# Patient Record
Sex: FEMALE | Race: WHITE | Employment: FULL TIME | URBAN - METROPOLITAN AREA
[De-identification: names, ages, dates, MRNs, and addresses within clinical notes are randomized per-mention and may not be internally consistent; named-entity substitution may affect disease eponyms.]

---

## 2024-01-29 ENCOUNTER — OFFICE VISIT (OUTPATIENT)
Dept: FAMILY MEDICINE CLINIC | Facility: CLINIC | Age: 34
End: 2024-01-29

## 2024-01-29 VITALS
WEIGHT: 151 LBS | HEIGHT: 64 IN | SYSTOLIC BLOOD PRESSURE: 118 MMHG | HEART RATE: 80 BPM | TEMPERATURE: 98.3 F | BODY MASS INDEX: 25.78 KG/M2 | DIASTOLIC BLOOD PRESSURE: 70 MMHG | OXYGEN SATURATION: 99 %

## 2024-01-29 DIAGNOSIS — Z59.89 DOES NOT HAVE HEALTH INSURANCE: ICD-10-CM

## 2024-01-29 DIAGNOSIS — Z32.00 POSSIBLE PREGNANCY: Primary | ICD-10-CM

## 2024-01-29 DIAGNOSIS — R10.32 LEFT LOWER QUADRANT ABDOMINAL PAIN: ICD-10-CM

## 2024-01-29 DIAGNOSIS — Z76.89 ENCOUNTER TO ESTABLISH CARE: ICD-10-CM

## 2024-01-29 DIAGNOSIS — Z32.00 ENCOUNTER FOR ASSESSMENT FOR SUSPECTED ECTOPIC PREGNANCY: ICD-10-CM

## 2024-01-29 DIAGNOSIS — N92.6 MISSED PERIOD: ICD-10-CM

## 2024-01-29 DIAGNOSIS — O26.899 PELVIC PAIN DURING PREGNANCY: ICD-10-CM

## 2024-01-29 DIAGNOSIS — R10.2 PELVIC PAIN DURING PREGNANCY: ICD-10-CM

## 2024-01-29 LAB — SL AMB POCT URINE HCG: POSITIVE

## 2024-01-29 PROCEDURE — 81025 URINE PREGNANCY TEST: CPT | Performed by: FAMILY MEDICINE

## 2024-01-29 PROCEDURE — 99203 OFFICE O/P NEW LOW 30 MIN: CPT | Performed by: FAMILY MEDICINE

## 2024-01-29 SDOH — ECONOMIC STABILITY - INCOME SECURITY: OTHER PROBLEMS RELATED TO HOUSING AND ECONOMIC CIRCUMSTANCES: Z59.89

## 2024-01-29 NOTE — ASSESSMENT & PLAN NOTE
Recent irregular periods since October 2023, per patient last menstrual period was 11/29/23, but not sure as it was mainly just spotting   Took home pregnancy test on 12/24/23 which was positive   POCT pregnancy test positive today   Per patient this is first pregnancy ever     Established care at this visit   Paper work for [facility] authorization given   Referral for social work to apply for insurance as patient is self pay   Initial OB panel ordered at this visit   Instructed patient to complete labs after applying for insurance   Schedule initial OB intake visit

## 2024-01-29 NOTE — ASSESSMENT & PLAN NOTE
LLQ abdominal pain with some pelvic pain. Tender to palpation   Recent home pregnancy and POCT pregnancy tests positive   Per patient, this is first pregnancy ever     Suspect ectopic pregnancy due to location of pain and first pregnancy   Transvaginal US and quantitative beta-HCG labs   Social work referral as patient is self-pay and testing is expensive   Instructed patient to schedule US and complete labs after applying for insurance/speaking with .   Complete [facility] authorization paperwork   Schedule initial OB intake visit

## 2024-01-29 NOTE — PROGRESS NOTES
[facility] Office Visit  Inderjit Munoz  [id number]    Assessment/Plan:     1. Possible pregnancy  Assessment & Plan:  Recent irregular periods since October 2023, per patient last menstrual period was 11/29/23, but not sure as it was mainly just spotting   Took home pregnancy test on 12/24/23 which was positive   POCT pregnancy test positive today   Per patient this is first pregnancy ever     Established care at this visit   Paper work for [facility] authorization given   Referral for social work to apply for insurance as patient is self pay   Initial OB panel ordered at this visit   Instructed patient to complete labs after applying for insurance   Schedule initial OB intake visit     Orders:  -     POCT urine HCG  -     OBSTETRIC PANEL W/FOURTH GENERATION HIV & HEPATITIS C AB W/REFL; Future  -     hCG, quantitative; Future    2. Missed period  Comments:  LMP 11/29/23 but unclear as periods have been irregular/spotting for 1-2 months prior to stopping  Orders:  -     OBSTETRIC PANEL W/FOURTH GENERATION HIV & HEPATITIS C AB W/REFL; Future  -     hCG, quantitative; Future    3. Left lower quadrant abdominal pain  Assessment & Plan:  LLQ abdominal pain with some pelvic pain. Tender to palpation   Recent home pregnancy and POCT pregnancy tests positive   Per patient, this is first pregnancy ever     Suspect ectopic pregnancy due to location of pain and first pregnancy   Transvaginal US and quantitative beta-HCG labs   Social work referral as patient is self-pay and testing is expensive   Instructed patient to schedule US and complete labs after applying for insurance/speaking with .   Complete [facility] authorization paperwork   Schedule initial OB intake visit     Orders:  -     US pelvis complete w transvaginal; Future; Expected date: 01/29/2024    4. Pelvic pain during pregnancy  -     US pelvis complete w transvaginal; Future; Expected date: 01/29/2024    5. Encounter for assessment for  suspected ectopic pregnancy  -     US pelvis complete w transvaginal; Future; Expected date: 01/29/2024  -     hCG, quantitative; Future    6. Encounter to establish care    7. Does not have health insurance  -     Ambulatory Referral to Social Work Care Management Program; Future          Return for Initial OB intake .     Subjective:   JUANITO Munoz is a 33 y.o. female who presents to establish care.   She states that periods have been irregular. In September was her last regular period, in October she was spotting, and in November she did not get her period at all.   She thinks her last period was on 11/29/2023.   She took a home pregnancy test on 12/24/2023 which was positive. She then called to make an appointment at [facility] to establish care and receive prenatal services.   Per patient this is her first time being pregnant. This is an unexpected, but wanted pregnancy. The father of the baby is not involved.   She is having some headaches and some mild pain/spasms on the left side of the lower abdomen/pelvis region.   She also had a cold recently that is resolving with some residual cough and congestion.   She rents a room in a house. She lives alone in her room but there are 2 other men who rent rooms in the house as well. She currently works at [employer] as a .      Review of Systems   Constitutional:  Negative for chills and fever.   HENT:  Positive for congestion. Negative for sore throat.    Eyes:  Negative for pain and redness.   Respiratory:  Positive for cough. Negative for chest tightness and shortness of breath.    Cardiovascular:  Negative for chest pain and palpitations.   Gastrointestinal:  Positive for abdominal pain (LLQ, pelvis) and nausea. Negative for diarrhea.   Genitourinary:  Positive for vaginal discharge (white, sticky). Negative for difficulty urinating, dysuria and hematuria.   Musculoskeletal:  Negative for back pain and myalgias.   Skin:  Negative for color change,  "pallor and rash.   Neurological:  Positive for headaches. Negative for dizziness and light-headedness.        Objective:     /70 (BP Location: Left arm, Patient Position: Sitting, Cuff Size: Standard)   Pulse 80   Temp 98.3 °F (36.8 °C) (Tympanic)   Ht 5' 4\" (1.626 m)   Wt 68.5 kg (151 lb)   SpO2 99%   BMI 25.92 kg/m²      Physical Exam  Vitals reviewed.   Constitutional:       Appearance: She is overweight.   HENT:      Head: Normocephalic and atraumatic.      Right Ear: Tympanic membrane, ear canal and external ear normal.      Left Ear: Tympanic membrane, ear canal and external ear normal.      Nose: Nose normal.      Mouth/Throat:      Mouth: Mucous membranes are moist.      Pharynx: Oropharynx is clear. No oropharyngeal exudate or posterior oropharyngeal erythema.   Eyes:      General: No scleral icterus.     Extraocular Movements: Extraocular movements intact.      Conjunctiva/sclera: Conjunctivae normal.      Pupils: Pupils are equal, round, and reactive to light.   Cardiovascular:      Rate and Rhythm: Normal rate and regular rhythm.      Heart sounds: Normal heart sounds. No murmur heard.  Pulmonary:      Effort: Pulmonary effort is normal. No respiratory distress.      Breath sounds: Normal breath sounds. No wheezing.   Abdominal:      General: Bowel sounds are normal.      Palpations: Abdomen is soft. There is no mass.      Tenderness: There is abdominal tenderness in the left lower quadrant.   Musculoskeletal:         General: Normal range of motion.      Cervical back: Normal range of motion.      Right lower leg: No edema.      Left lower leg: No edema.   Skin:     General: Skin is warm.   Neurological:      Mental Status: She is alert and oriented to person, place, and time.   Psychiatric:         Mood and Affect: Mood normal.         Behavior: Behavior normal.          ** Please Note: This note may have been constructed using a voice recognition system **     Sheron Cullen, " DO  01/29/24  9:28 AM

## 2024-01-30 ENCOUNTER — APPOINTMENT (OUTPATIENT)
Dept: LAB | Facility: HOSPITAL | Age: 34
End: 2024-01-30
Payer: COMMERCIAL

## 2024-01-30 ENCOUNTER — HOSPITAL ENCOUNTER (OUTPATIENT)
Dept: RADIOLOGY | Facility: HOSPITAL | Age: 34
Discharge: HOME | End: 2024-01-30
Payer: COMMERCIAL

## 2024-01-30 DIAGNOSIS — Z32.00 ENCOUNTER FOR ASSESSMENT FOR SUSPECTED ECTOPIC PREGNANCY: ICD-10-CM

## 2024-01-30 DIAGNOSIS — Z32.00 POSSIBLE PREGNANCY: ICD-10-CM

## 2024-01-30 DIAGNOSIS — O26.899 PELVIC PAIN DURING PREGNANCY: ICD-10-CM

## 2024-01-30 DIAGNOSIS — R10.2 PELVIC PAIN DURING PREGNANCY: ICD-10-CM

## 2024-01-30 DIAGNOSIS — N92.6 MISSED PERIOD: ICD-10-CM

## 2024-01-30 DIAGNOSIS — R10.32 LEFT LOWER QUADRANT ABDOMINAL PAIN: ICD-10-CM

## 2024-01-30 LAB
ABO GROUP BLD: NORMAL
B-HCG SERPL-ACNC: ABNORMAL MIU/ML (ref 0–5)
BACTERIA UR QL AUTO: ABNORMAL /HPF
BASOPHILS # BLD AUTO: 0.02 THOUSANDS/ÂΜL (ref 0–0.1)
BASOPHILS NFR BLD AUTO: 0 % (ref 0–1)
BILIRUB UR QL STRIP: NEGATIVE
BLD GP AB SCN SERPL QL: NEGATIVE
CLARITY UR: CLEAR
COLOR UR: YELLOW
EOSINOPHIL # BLD AUTO: 0.04 THOUSAND/ÂΜL (ref 0–0.61)
EOSINOPHIL NFR BLD AUTO: 1 % (ref 0–6)
ERYTHROCYTE [DISTWIDTH] IN BLOOD BY AUTOMATED COUNT: 13.3 % (ref 11.6–15.1)
GLUCOSE UR STRIP-MCNC: NEGATIVE MG/DL
HCT VFR BLD AUTO: 35.9 % (ref 34.8–46.1)
HGB BLD-MCNC: 12.4 G/DL (ref 11.5–15.4)
HGB UR QL STRIP.AUTO: NEGATIVE
HIV 1+2 AB+HIV1 P24 AG SERPL QL IA: NORMAL
HIV 2 AB SERPL QL IA: NORMAL
HIV1 AB SERPL QL IA: NORMAL
HIV1 P24 AG SERPL QL IA: NORMAL
IMM GRANULOCYTES # BLD AUTO: 0.02 THOUSAND/UL (ref 0–0.2)
IMM GRANULOCYTES NFR BLD AUTO: 0 % (ref 0–2)
KETONES UR STRIP-MCNC: NEGATIVE MG/DL
LEUKOCYTE ESTERASE UR QL STRIP: ABNORMAL
LYMPHOCYTES # BLD AUTO: 1.34 THOUSANDS/ÂΜL (ref 0.6–4.47)
LYMPHOCYTES NFR BLD AUTO: 22 % (ref 14–44)
MCH RBC QN AUTO: 30.4 PG (ref 26.8–34.3)
MCHC RBC AUTO-ENTMCNC: 34.5 G/DL (ref 31.4–37.4)
MCV RBC AUTO: 88 FL (ref 82–98)
MONOCYTES # BLD AUTO: 0.32 THOUSAND/ÂΜL (ref 0.17–1.22)
MONOCYTES NFR BLD AUTO: 5 % (ref 4–12)
NEUTROPHILS # BLD AUTO: 4.41 THOUSANDS/ÂΜL (ref 1.85–7.62)
NEUTS SEG NFR BLD AUTO: 72 % (ref 43–75)
NITRITE UR QL STRIP: NEGATIVE
NON-SQ EPI CELLS URNS QL MICRO: ABNORMAL /HPF
NRBC BLD AUTO-RTO: 0 /100 WBCS
PH UR STRIP.AUTO: 7 [PH]
PLATELET # BLD AUTO: 245 THOUSANDS/UL (ref 149–390)
PMV BLD AUTO: 8.7 FL (ref 8.9–12.7)
PROT UR STRIP-MCNC: ABNORMAL MG/DL
RBC # BLD AUTO: 4.08 MILLION/UL (ref 3.81–5.12)
RBC #/AREA URNS AUTO: ABNORMAL /HPF
RH BLD: POSITIVE
RUBV IGG SERPL IA-ACNC: 151.5 IU/ML
SP GR UR STRIP.AUTO: 1.01 (ref 1–1.03)
SPECIMEN EXPIRATION DATE: NORMAL
TREPONEMA PALLIDUM IGG+IGM AB [PRESENCE] IN SERUM OR PLASMA BY IMMUNOASSAY: NORMAL
UROBILINOGEN UR QL STRIP.AUTO: 0.2 E.U./DL
WBC # BLD AUTO: 6.15 THOUSAND/UL (ref 4.31–10.16)
WBC #/AREA URNS AUTO: ABNORMAL /HPF

## 2024-01-30 PROCEDURE — 86780 TREPONEMA PALLIDUM: CPT

## 2024-01-30 PROCEDURE — 86762 RUBELLA ANTIBODY: CPT

## 2024-01-30 PROCEDURE — 86850 RBC ANTIBODY SCREEN: CPT

## 2024-01-30 PROCEDURE — 87340 HEPATITIS B SURFACE AG IA: CPT

## 2024-01-30 PROCEDURE — 76801 OB US < 14 WKS SINGLE FETUS: CPT

## 2024-01-30 PROCEDURE — 86900 BLOOD TYPING SEROLOGIC ABO: CPT

## 2024-01-30 PROCEDURE — 81001 URINALYSIS AUTO W/SCOPE: CPT

## 2024-01-30 PROCEDURE — 87086 URINE CULTURE/COLONY COUNT: CPT

## 2024-01-30 PROCEDURE — 85025 COMPLETE CBC W/AUTO DIFF WBC: CPT

## 2024-01-30 PROCEDURE — 86901 BLOOD TYPING SEROLOGIC RH(D): CPT

## 2024-01-30 PROCEDURE — 84702 CHORIONIC GONADOTROPIN TEST: CPT

## 2024-01-30 PROCEDURE — 36415 COLL VENOUS BLD VENIPUNCTURE: CPT

## 2024-01-30 PROCEDURE — 86803 HEPATITIS C AB TEST: CPT

## 2024-01-30 PROCEDURE — 87389 HIV-1 AG W/HIV-1&-2 AB AG IA: CPT

## 2024-01-31 LAB
HBV SURFACE AG SER QL: NORMAL
HCV AB S/CO SERPL IA: NON REACTIVE
SL AMB INTERPRETATION: NORMAL

## 2024-02-01 LAB — BACTERIA UR CULT: NORMAL

## 2024-02-05 NOTE — PROGRESS NOTES
OB/GYN  PN Visit  Inderjit Aguero  [id number]  2024  Dr. Harleen Soto MD    S: 33 y.o.  Unknown here for PN visit. She denies contractions. She denies leakage of fluid and vaginal bleeding. She does not report fetal movement yet. Her pregnancy is uncomplicated.     Patient is from [place], [place] in  and is currently uninsured. Patient is unsure of LMP, had spotting on 2024. Periods are usually painful, last 1-2 weeks. Has cysts in ovaries per [facility], patient thought she would never get pregnant but she is excited about the pregnancy. FOB is not involved.     O:  Vitals:    24 0815   BP: 124/60   Pulse: 73   Resp: 18   Temp: (!) 97.3 °F (36.3 °C)   SpO2: 100%     Physical Exam  Vitals reviewed.   Constitutional:       General: She is not in acute distress.     Appearance: Normal appearance.   Cardiovascular:      Rate and Rhythm: Normal rate and regular rhythm.      Heart sounds: Normal heart sounds. No murmur heard.  Pulmonary:      Effort: Pulmonary effort is normal. No respiratory distress.      Breath sounds: Normal breath sounds.   Abdominal:      General: Bowel sounds are normal.      Palpations: Abdomen is soft.      Tenderness: There is no abdominal tenderness.      Comments: Gravid     Musculoskeletal:      Right lower leg: No edema.      Left lower leg: No edema.   Neurological:      Mental Status: She is alert.       Fundal height: 13cm    FHT: 160bpms       A/P:    1. Encounter for supervision of low-risk pregnancy in first trimester  -     Liquid-based pap, diagnostic  -     POCT urine dip  -     Prenatal Vit-Fe Fumarate-FA (Prenatal Vitamin) 27-0.8 MG TABS; Take 1 tablet by mouth in the morning    2. 13 weeks gestation of pregnancy  Assessment & Plan:   at 13w1d gestation per ultrasound. Results of ultrasound are preliminary at this time and final report is still pending. LMP unknown.     Initial OB labs reviewed and are wnl  Pap  smear with HPV performed, follow up results  Continue PNV  Labor precautions reviewed  Ultrasounds: 1st trimester u/s performed, final read pending  Tdap at 28 weeks.  Delivery: anticipate   Contraception: Not discussed this visit  Uninsured, referral to social work last visit.   RTO in 4 weeks     Orders:  -     Prenatal Vit-Fe Fumarate-FA (Prenatal Vitamin) 27-0.8 MG TABS; Take 1 tablet by mouth in the morning          Future Appointments   Date Time Provider Department Center   3/5/2024  8:00 AM Harleen Soto MD [facility]         Harleen Hannon MD  Family Medicine PGY-3

## 2024-02-06 ENCOUNTER — INITIAL PRENATAL (OUTPATIENT)
Dept: FAMILY MEDICINE CLINIC | Facility: CLINIC | Age: 34
End: 2024-02-06

## 2024-02-06 VITALS
DIASTOLIC BLOOD PRESSURE: 60 MMHG | RESPIRATION RATE: 18 BRPM | TEMPERATURE: 97.3 F | HEART RATE: 73 BPM | OXYGEN SATURATION: 100 % | BODY MASS INDEX: 25.71 KG/M2 | SYSTOLIC BLOOD PRESSURE: 124 MMHG | HEIGHT: 64 IN | WEIGHT: 150.6 LBS

## 2024-02-06 DIAGNOSIS — Z34.91 ENCOUNTER FOR SUPERVISION OF LOW-RISK PREGNANCY IN FIRST TRIMESTER: Primary | ICD-10-CM

## 2024-02-06 DIAGNOSIS — Z3A.13 13 WEEKS GESTATION OF PREGNANCY: ICD-10-CM

## 2024-02-06 LAB
SL AMB  POCT GLUCOSE, UA: NEGATIVE
SL AMB POCT URINE PROTEIN: NEGATIVE

## 2024-02-06 PROCEDURE — 87624 HPV HI-RISK TYP POOLED RSLT: CPT

## 2024-02-06 PROCEDURE — 88175 CYTOPATH C/V AUTO FLUID REDO: CPT | Performed by: PATHOLOGY

## 2024-02-06 PROCEDURE — 81002 URINALYSIS NONAUTO W/O SCOPE: CPT | Performed by: FAMILY MEDICINE

## 2024-02-06 PROCEDURE — PNV: Performed by: FAMILY MEDICINE

## 2024-02-06 RX ORDER — PNV NO.95/FERROUS FUM/FOLIC AC 28MG-0.8MG
1 TABLET ORAL DAILY
Qty: 90 TABLET | Refills: 2 | Status: SHIPPED | OUTPATIENT
Start: 2024-02-06

## 2024-02-06 NOTE — ASSESSMENT & PLAN NOTE
at 13w1d gestation per ultrasound. Results of ultrasound are preliminary at this time and final report is still pending. LMP unknown.     Initial OB labs reviewed and are wnl  Pap smear with HPV performed, follow up results  Continue PNV  Labor precautions reviewed  Ultrasounds: 1st trimester u/s performed, final read pending  Tdap at 28 weeks.  Delivery: anticipate   Contraception: Not discussed this visit  Uninsured, referral to social work last visit.   RTO in 4 weeks

## 2024-02-07 ENCOUNTER — PATIENT OUTREACH (OUTPATIENT)
Dept: FAMILY MEDICINE CLINIC | Facility: CLINIC | Age: 34
End: 2024-02-07

## 2024-02-07 LAB
AMPHETAMINES UR QL SCN: NEGATIVE NG/ML
BARBITURATES UR QL SCN: NEGATIVE NG/ML
BENZODIAZ UR QL: NEGATIVE NG/ML
BZE UR QL: NEGATIVE NG/ML
CANNABINOIDS UR QL SCN: NEGATIVE NG/ML
ETHANOL UR-MCNC: NEGATIVE %
HPV HR 12 DNA CVX QL NAA+PROBE: NEGATIVE
HPV16 DNA CVX QL NAA+PROBE: NEGATIVE
HPV18 DNA CVX QL NAA+PROBE: NEGATIVE
OPIATES UR QL: NEGATIVE NG/ML
PCP UR QL: NEGATIVE NG/ML

## 2024-02-07 NOTE — PROGRESS NOTES
[facility] had received a referral from Sheron Cullen, DO r/t patient not having insurance. [facility] had completed a chart review. Per chart, patient noted as self-pay at this time. Per chart, patient originally from [place] and [place].    [facility] had called the patient via phone. [facility] spoke in the preferred language, Estonian. [facility] had introduced herself and reason for consult. [facility] had asked the patient how she was doing. Patient reported that is doing well.     Patient reported she obtained work visa and has SSN. [facility] informed the patient that she would need to have permament resident status. [facility] informed the patient that once she has this then she needs to wait five years after obtaining permanent status before she can enroll in Medicaid. Patient understood. [facility] informed the patient that she would only obtain emergency Medicaid to cover labor and delivery, but it would not be completed until she gives birth. Patient thanked [facility] for the information.    Patient reported she had begun [facility] paperwork on 1/31 with the Financial Counselor, Charles Irby. The patient stated she is does not have paystubs from old job which she needs to pay for July 2023 hospital bill. [facility] advised patient to try to speak with Charles about what else can be used. [facility] informed the patient that she may be able to use a letter from her former employer. [facility] informed the patient that Charles has  services. Patient understood and reported she would outreach Charles.    Patient reported she lives with alone. Patient reported she has friends for support. Patient reported she has no family in the area. Patient reported FOB is not involved at this time.     Patient denied any hx of MH. Patient denied any hx of D&A. Patient stated she works full-time. Patient reported she mostly keeps to herself.     [facility] educated patient on WIC services through [employer]. [facility] informed patient that she would need a letter from PCP confirming  pregnancy and due date. [facility] informed hte patient that she can obtain this at her next appointment. [facility] sent information via [employer] tool as patient was at work. Patient was referred on [employer] to [facility] (program) for food. Patient did not report any transportation, food or housing issues at this time. Patient denied any other needs at this time.     [facility] had provided her contact information. [facility] advised patient reach out as needed. Patient agreed. Patient denied any continued  outreach at this time. [facility] closed referral. Please reconsult.

## 2024-02-14 LAB
LAB AP GYN PRIMARY INTERPRETATION: ABNORMAL
LAB AP LMP: ABNORMAL
Lab: ABNORMAL
PATH INTERP SPEC-IMP: ABNORMAL

## 2024-02-14 PROCEDURE — 88141 CYTOPATH C/V INTERPRET: CPT | Performed by: PATHOLOGY

## 2024-03-05 ENCOUNTER — ROUTINE PRENATAL (OUTPATIENT)
Age: 34
End: 2024-03-05

## 2024-03-05 VITALS
SYSTOLIC BLOOD PRESSURE: 98 MMHG | WEIGHT: 154 LBS | HEART RATE: 88 BPM | BODY MASS INDEX: 26.43 KG/M2 | RESPIRATION RATE: 18 BRPM | OXYGEN SATURATION: 100 % | DIASTOLIC BLOOD PRESSURE: 58 MMHG

## 2024-03-05 DIAGNOSIS — Z34.91 ENCOUNTER FOR SUPERVISION OF LOW-RISK PREGNANCY IN FIRST TRIMESTER: Primary | ICD-10-CM

## 2024-03-05 DIAGNOSIS — Z3A.17 17 WEEKS GESTATION OF PREGNANCY: ICD-10-CM

## 2024-03-05 LAB
EXT GLUCOSE, UA: NEGATIVE
EXT PROTEIN, UA: ABNORMAL MG/DL

## 2024-03-05 PROCEDURE — PNV: Performed by: OBSTETRICS & GYNECOLOGY

## 2024-03-05 PROCEDURE — 81002 URINALYSIS NONAUTO W/O SCOPE: CPT

## 2024-03-05 NOTE — PROGRESS NOTES
OB/GYN  PN Visit  Inderjit Matos  [id number]  3/5/2024  Dr. Harleen Soto MD    S: 33 y.o.  17w1d here for PN visit. She denies contractions. She denies leakage of fluid and vaginal bleeding. She believes she is feeling baby move.Her pregnancy is uncomplicated.     O:  Vitals:    24 0805   BP: 98/58   Pulse: 88   Resp: 18   SpO2: 100%     Physical Exam  Vitals reviewed.   Constitutional:       General: She is not in acute distress.     Appearance: Normal appearance.   Cardiovascular:      Rate and Rhythm: Normal rate and regular rhythm.      Heart sounds: Normal heart sounds.   Pulmonary:      Effort: Pulmonary effort is normal. No respiratory distress.      Breath sounds: Normal breath sounds.   Abdominal:      Palpations: Abdomen is soft.      Tenderness: There is no abdominal tenderness.      Comments: Gravid   Neurological:      Mental Status: She is alert.       Fundal height: 17cm  FHT: 142bpm     A/P:    1. Encounter for supervision of low-risk pregnancy in first trimester  -     Ambulatory Referral to Maternal Fetal Medicine; Future; Expected date: 2024    2. 17 weeks gestation of pregnancy  Assessment & Plan:  17w1d GESTATION    Continue PNV  Labor precautions reviewed  Fetal kick counts reviewed  GC/Chlamydia swab collected, await results  All other initial prenatal labs wnl  Ultrasounds: Single live intrauterine gestation at 12 weeks 1 days.  Will send M referral for level II US at 20 weeks  Vaccines: Tdap at 28 weeks.  Delivery: Anticipate   Contraception: undecided  RTO in 4 weeks    Orders:  -     POCT urinalysis dipstick  -     Chlamydia/GC amplified DNA by PCR  -     Ambulatory Referral to Maternal Fetal Medicine; Future; Expected date: 2024          Future Appointments   Date Time Provider Department Center   2024  8:00 AM Htet Jarod eLonardo MD  [facility]   2024  8:15 AM  [facility] 2 CHARANJIT AN [facility]       Harleen Hannon MD  [facility]  Medicine PGY-3

## 2024-03-05 NOTE — ASSESSMENT & PLAN NOTE
17w1d GESTATION    Continue PNV  Labor precautions reviewed  Fetal kick counts reviewed  GC/Chlamydia swab collected, await results  All other initial prenatal labs wnl  Ultrasounds: Single live intrauterine gestation at 12 weeks 1 days.  Will send M referral for level II US at 20 weeks  Vaccines: Tdap at 28 weeks.  Delivery: Anticipate   Contraception: undecided  RTO in 4 weeks

## 2024-03-05 NOTE — PATIENT INSTRUCTIONS
El embarazo de la semana 15 a la 18   LO QUE NECESITA SABER:   ¿Qué cambios están ocurriendo en mi cuerpo? Ahora que usted está en nye loco trimestre, tiene más energía. Es posible que también sienta más hambre de lo normal. Usted podría comenzar a experimentar otros síntomas, meagan acidez o mareos. Es posible que usted esté aumentando de ½ a 1 sharee por semana, y que nye embarazo se empiece a notar. Posiblemente usted necesite comenzar a usar ropa de maternidad.  ¿Cómo me jose cuidar en esta etapa de mi embarazo?      Controle la acidez comiendo 4 o 5 comidas pequeñas cada día en vez de comidas grandes. Evite los alimentos picantes. Evite comer rodrigue antes de irse a la cama.         Controle la náusea y el vómito. Evite los alimentos grasosos y picantes. Coma comidas pequeñas vicky el día en vez de porciones grandes. El jengibre puede ayudar a disminuir las náuseas. Consulte con nye médico acerca de otras formas para disminuir las náuseas y el vómito.    Consuma alimentos saludables y variados. Alimentos saludables incluyen frutas, verduras, panes de wilfred integral, alimentos lácteos bajos en grasa, frijoles, devyn magras y pescado. Tyndall líquidos meagan se le haya indicado. Pregunte cuánto líquido debe donal cada día y cuáles líquidos son los más adecuados para usted. Limite el consumo de cafeína a menos de 200 miligramos cada día. Limite el consumo de pescado a 2 porciones cada semana. Escoja pescado con concentraciones bajas de cierra meagan atún al natural enlatado, camarón, salmón, bacalao o tilapia. No coma pescado con concentraciones altas de cierra meagan pez zaina, caballa gigante, pargo rayado y tiburón.         Tyndall vitaminas prenatales según las indicaciones. Nye necesidad de ciertas vitaminas y minerales, meagan el ácido fólico, aumenta vicky el embarazo. Las vitaminas prenatales proporcionan algunas de las vitaminas y minerales adicionales que usted necesita. Las vitaminas prenatales también podrían  ayudar a disminuir el riesgo de ciertos defectos de nacimiento.         No fume. Fumar aumenta el riesgo de aborto espontáneo y otros problemas de shala vicky nye embarazo. Fumar puede causar que nye bebé nazca antes de tiempo o que pese menos al nacer. Solicite información a nye médico si usted necesita ayuda para dejar de fumar.    No consuma alcohol. El alcohol pasa de nye cuerpo al bebé a través de la placenta. Puede afectar el desarrollo del cerebro de nye bebé y provocar el síndrome de alcoholismo fetal (SAF). El SAF es un ashley de condiciones que causan problemas mentales, de comportamiento y de crecimiento.    Consulte con nye médico antes de donal cualquier medicamento. Muchos medicamentos pueden perjudicar a nye bebé si usted los cuong mientras está embarazada. No tome ningún medicamento, vitaminas, hierbas o suplementos sin nghia consultar con nye médico. Nunca  use drogas ilegales o de la simpson (meagan marihuana o cocaína) mientras está embarazada.  ¿Cuáles son algunos consejos de seguridad vicky el embarazo?  Evite jacuzzis y saunas. No use un jacuzzi o un sauna mientras usted está embarazada, especialmente vicky el primer trimestre. Los jacuzzis y los saunas aumentan la temperatura de nye bebé y el riesgo de defectos de nacimiento.    Evite la toxoplasmosis. Oak es bernardino infección causada por comer carne cruda o estar cerca del excremento de un alf infectado. Oak puede causar malformaciones congénitas, aborto espontáneo y otros problemas. Lávese las leia después de tocar carne cruda. Asegúrese de que la carne esté tone cocida antes de comerla. Evite los huevos crudos y la leche despasteurizada. Use guantes o pida que alguien la ayude a limpiar la caja de arena del alf mientras usted está embarazada.    ¿Qué cambios están ocurriendo con mi bebé? Para las 18 semanas, nye bebé podría medir alrededor de 6 pulgadas de steffi desde la noman hasta la rabadilla (el cóccix). Es posible que nye bebé pese  alrededor de 11 onzas. Usted podría sentir los movimientos de nye bebé aproximadamente a las 18 semanas o después. Podría ser que los primeros movimientos no gregg adams notorios. Los movimientos podrían sentirse meagan bernardino sensación de revoloteo. Nye bebé también hace movimientos de succión y puede escuchar ciertos sonidos.  ¿Qué necesito saber acerca del cuidado prenatal? Vicky las primeras 28 semanas de nye embarazo, usted tendrá citas mensuales con nye médico. Nye médico le revisará nye presión arterial y peso. Es posible que también necesite alguno de los siguientes tratamientos:  Un examen de orina también podría realizarse para revisarle el azúcar y la proteína. Estas son señales de diabetes gestacional o de infección.    Los análisis de albina se pueden realizar para revisar si tiene signos de anemia (nivel bajo del ramos).    La revisión de la altura del fondo uterino es bernardino medición del útero para controlar el desarrollo de nye bebé. Marzena número por lo general es igual al número de semanas que usted tiene de embarazo.    Bernardino ecografía podría realizarse para revisar el desarrollo de nye bebé. Es posible que nye médico pueda decirle cuál es el sexo de nye bebé vicky la ecografía.         El ritmo cardíaco de nye bebé será revisado.    ¿Cuándo jose buscar atención inmediata?  Usted tiene dolor o cólicos en el abdomen o la parte baja de la espalda.    Usted tiene sangrado vaginal abundante o coágulos.    Le sale un material que parece tejido o coágulos grandes. Recolecte el material y tráigalo con usted.    ¿Cuándo jose llamar a mi médico u obstetra?  Usted no puede retener alimentos ni líquidos y está perdiendo peso.    Usted tiene un sangrado leve.    Usted tiene escalofríos o fiebre.    Usted tiene comezón, ardor o dolor vaginal.    Usted tiene bernardino secreción vaginal amarillenta, verdosa, miguel a o de olor desagradable.    Usted tiene dolor o ardor al orinar, orina menos de lo habitual o tiene orina rosada o  sanguinolenta.    Usted tiene preguntas o inquietudes acerca de nye condición o cuidado.    ACUERDOS SOBRE NYE CUIDADO:   Usted tiene el derecho de ayudar a planear nye cuidado. Aprenda todo lo que pueda sobre nye condición y meagan darle tratamiento. Discuta renate opciones de tratamiento con renate médicos para decidir el cuidado que usted desea recibir. Usted siempre tiene el derecho de rechazar el tratamiento.Esta información es sólo para uso en educación. Nye intención no es darle un consejo médico sobre enfermedades o tratamientos. Colsulte con nye médico, enfermera o farmacéutico antes de seguir cualquier régimen médico para saber si es seguro y efectivo para usted.  © Copyright [employer] 2023 Information is for End User's use only and may not be sold, redistributed or otherwise used for commercial purposes.

## 2024-03-06 ENCOUNTER — TELEPHONE (OUTPATIENT)
Age: 34
End: 2024-03-06

## 2024-03-06 NOTE — TELEPHONE ENCOUNTER
Hi, my name is Gilda. I'm calling from [facility] and I'm calling because we had gotten this swab on Inderjit Walker birth date [birth year]. It looks like a Chlamydia swab and we are unable to do the testing because there's two swabs in the swab. Our number here is [phone]. Option one. Thanks.

## 2024-03-07 NOTE — TELEPHONE ENCOUNTER
Called the lab to clarify and only one swab needs to be sent in the specimen container. We can have patient come back for nurses visit and grab a urine GC/Chlamydia instead. TY No

## 2024-03-07 NOTE — TELEPHONE ENCOUNTER
Chelsi is calling form [facility] to notify ordering provider that recent lab for Chlamydia is being canceled due to (2) swabs being in specimen container. Would you like patient to schedule OVS to be re-swabbed, please advise.

## 2024-03-08 ENCOUNTER — TELEPHONE (OUTPATIENT)
Age: 34
End: 2024-03-08

## 2024-04-02 ENCOUNTER — ROUTINE PRENATAL (OUTPATIENT)
Age: 34
End: 2024-04-02

## 2024-04-02 VITALS
WEIGHT: 158.2 LBS | HEIGHT: 64 IN | TEMPERATURE: 98.6 F | SYSTOLIC BLOOD PRESSURE: 100 MMHG | BODY MASS INDEX: 27.01 KG/M2 | OXYGEN SATURATION: 100 % | RESPIRATION RATE: 18 BRPM | HEART RATE: 78 BPM | DIASTOLIC BLOOD PRESSURE: 62 MMHG

## 2024-04-02 DIAGNOSIS — Z3A.21 21 WEEKS GESTATION OF PREGNANCY: ICD-10-CM

## 2024-04-02 DIAGNOSIS — Z34.91 ENCOUNTER FOR SUPERVISION OF LOW-RISK PREGNANCY IN FIRST TRIMESTER: Primary | ICD-10-CM

## 2024-04-02 LAB
SL AMB  POCT GLUCOSE, UA: NEGATIVE
SL AMB POCT URINE PROTEIN: 15

## 2024-04-02 PROCEDURE — 81002 URINALYSIS NONAUTO W/O SCOPE: CPT | Performed by: OBSTETRICS & GYNECOLOGY

## 2024-04-02 PROCEDURE — PNV: Performed by: OBSTETRICS & GYNECOLOGY

## 2024-04-02 PROCEDURE — 87591 N.GONORRHOEAE DNA AMP PROB: CPT

## 2024-04-02 PROCEDURE — 87491 CHLMYD TRACH DNA AMP PROBE: CPT

## 2024-04-02 NOTE — PROGRESS NOTES
"Subjective    Interpretor [id number]     Dairy Krystal Matos is a 33 y.o. female being seen today for her obstetrical visit. She is at 21w1d gestation. Patient reports no bleeding, no contractions, no cramping, and no leaking. Fetal movement: normal. Reported feeling stretching and pulling sensation of the abdomen on both sides. Denied abdominal or back pain.      Menstrual History:  OB History          1    Para        Term                AB        Living             SAB        IAB        Ectopic        Multiple        Live Births                    Patient's last menstrual period was 2023.       The following portions of the patient's history were reviewed and updated as appropriate: allergies, current medications, past family history, past medical history, past social history, past surgical history, and problem list.    Review of Systems  Pertinent items are noted in HPI.     Objective      /62 (BP Location: Right arm, Patient Position: Sitting, Cuff Size: Adult)   Pulse 78   Temp 98.6 °F (37 °C) (Tympanic)   Resp 18   Ht 5' 4\" (1.626 m)   Wt 71.8 kg (158 lb 3.2 oz)   LMP 2023   SpO2 100%   BMI 27.15 kg/m²   FHT: 145 BPM   Uterine Size: 21 cm and size equals dates      Physical Exam  Vitals reviewed.   Constitutional:       General: She is not in acute distress.     Appearance: Normal appearance.   HENT:      Head: Normocephalic and atraumatic.   Cardiovascular:      Rate and Rhythm: Normal rate and regular rhythm.      Pulses: Normal pulses.      Heart sounds: Normal heart sounds. No murmur heard.  Pulmonary:      Effort: Pulmonary effort is normal. No respiratory distress.      Breath sounds: Normal breath sounds. No wheezing.   Abdominal:      General: Bowel sounds are normal.      Palpations: Abdomen is soft.      Comments: gravid   Skin:     General: Skin is warm and dry.   Neurological:      General: No focal deficit present.      Mental Status: She is alert and " oriented to person, place, and time.   Psychiatric:         Mood and Affect: Mood normal.         Behavior: Behavior normal.         Assessment     Pregnancy 21 and 1/7 weeks     Plan    1. Encounter for supervision of low-risk pregnancy in first trimester  -     POCT urine dip    2. 21 weeks gestation of pregnancy  Assessment & Plan:  21w1d GESTATION    Continue PNV  Labor precautions reviewed  Fetal kick counts reviewed  GC/Chlamydia swab repeated. Prior test had lab error  All other initial prenatal labs wnl  Ultrasounds: Single live intrauterine gestation at 12 weeks 1 days.  Scheduled M referral for level II US at 20 weeks  Vaccines: Tdap at 28 weeks. Completed 2 doses of Covid pfiser and a booster in  in [place].  Delivery: Anticipate   Contraception: undecided  RTO in 4 weeks    Orders:  -     Chlamydia/GC amplified DNA by PCR; Future          Follow up in 4 weeks.

## 2024-04-02 NOTE — ASSESSMENT & PLAN NOTE
21w1d GESTATION    Continue PNV  Labor precautions reviewed  Fetal kick counts reviewed  GC/Chlamydia swab repeated. Prior test had lab error  All other initial prenatal labs wnl  Ultrasounds: Single live intrauterine gestation at 12 weeks 1 days.  Scheduled [facility] referral for level II US at 20 weeks  Vaccines: Tdap at 28 weeks. Completed 2 doses of Covid pfiser and a booster in  in [place].  Delivery: Anticipate   Contraception: undecided  RTO in 4 weeks

## 2024-04-03 LAB
C TRACH DNA SPEC QL NAA+PROBE: NEGATIVE
N GONORRHOEA DNA SPEC QL NAA+PROBE: NEGATIVE

## 2024-04-13 NOTE — PROGRESS NOTES
Please refer to the [facility] ultrasound report in Ob Procedures for additional information regarding today's visit

## 2024-04-16 ENCOUNTER — ROUTINE PRENATAL (OUTPATIENT)
Facility: HOSPITAL | Age: 34
End: 2024-04-16

## 2024-04-16 VITALS
HEIGHT: 64 IN | BODY MASS INDEX: 27.86 KG/M2 | DIASTOLIC BLOOD PRESSURE: 72 MMHG | WEIGHT: 163.2 LBS | HEART RATE: 79 BPM | SYSTOLIC BLOOD PRESSURE: 118 MMHG

## 2024-04-16 DIAGNOSIS — Z36.3 ENCOUNTER FOR ANTENATAL SCREENING FOR MALFORMATIONS: Primary | ICD-10-CM

## 2024-04-16 DIAGNOSIS — Z36.86 ENCOUNTER FOR ANTENATAL SCREENING FOR CERVICAL LENGTH: ICD-10-CM

## 2024-04-16 DIAGNOSIS — Z3A.23 23 WEEKS GESTATION OF PREGNANCY: ICD-10-CM

## 2024-04-16 DIAGNOSIS — O43.192 MARGINAL INSERTION OF UMBILICAL CORD AFFECTING MANAGEMENT OF MOTHER IN SECOND TRIMESTER: ICD-10-CM

## 2024-04-16 PROCEDURE — 76817 TRANSVAGINAL US OBSTETRIC: CPT | Performed by: OBSTETRICS & GYNECOLOGY

## 2024-04-16 PROCEDURE — 76805 OB US >/= 14 WKS SNGL FETUS: CPT | Performed by: OBSTETRICS & GYNECOLOGY

## 2024-04-16 PROCEDURE — 99243 OFF/OP CNSLTJ NEW/EST LOW 30: CPT | Performed by: OBSTETRICS & GYNECOLOGY

## 2024-04-16 RX ORDER — ASPIRIN 81 MG/1
162 TABLET, CHEWABLE ORAL
Qty: 180 TABLET | Refills: 1 | Status: SHIPPED | OUTPATIENT
Start: 2024-04-16 | End: 2024-07-15

## 2024-04-16 NOTE — LETTER
April 16, 2024     Harleen Soto MD  [address]  [address]  [place] [address]    Patient: Inderjit Matos   YOB: 1990   Date of Visit: 4/16/2024       Dear Dr. Parvez Soto:    Thank you for referring Inderjit Matos to me for evaluation. Below are my notes for this consultation.    If you have questions, please do not hesitate to call me. I look forward to following your patient along with you.         Sincerely,        Sami Vilchis MD        CC: No Recipients    Sami Vilchis MD  4/16/2024  8:36 AM  Sign when Signing Visit  Please refer to the [facility] ultrasound report in Ob Procedures for additional information regarding today's visit

## 2024-04-30 ENCOUNTER — ROUTINE PRENATAL (OUTPATIENT)
Age: 34
End: 2024-04-30

## 2024-04-30 VITALS
SYSTOLIC BLOOD PRESSURE: 90 MMHG | HEIGHT: 64 IN | BODY MASS INDEX: 28.2 KG/M2 | TEMPERATURE: 98 F | OXYGEN SATURATION: 99 % | HEART RATE: 82 BPM | WEIGHT: 165.2 LBS | RESPIRATION RATE: 18 BRPM | DIASTOLIC BLOOD PRESSURE: 58 MMHG

## 2024-04-30 DIAGNOSIS — Z34.02 ENCOUNTER FOR PRENATAL CARE IN SECOND TRIMESTER OF FIRST PREGNANCY: Primary | ICD-10-CM

## 2024-04-30 DIAGNOSIS — Z3A.25 25 WEEKS GESTATION OF PREGNANCY: ICD-10-CM

## 2024-04-30 LAB
SL AMB  POCT GLUCOSE, UA: NEGATIVE
SL AMB POCT URINE PROTEIN: NEGATIVE

## 2024-04-30 PROCEDURE — PNV: Performed by: OBSTETRICS & GYNECOLOGY

## 2024-04-30 PROCEDURE — 81002 URINALYSIS NONAUTO W/O SCOPE: CPT | Performed by: OBSTETRICS & GYNECOLOGY

## 2024-04-30 NOTE — PROGRESS NOTES
OB/GYN  PN Visit  Inderjit Matos  [id number]  2024  8:12 AM  Dr. Liban Milton MD    S: 33 y.o.  25w1d here for PN visit. She denies contractions. She denies leakage of fluid and vaginal bleeding. She denies good fetal movement. Her pregnancy is uncomplicated.     O:  There were no vitals filed for this visit.  Physical Exam  Constitutional:       General: She is not in acute distress.     Appearance: She is not ill-appearing.   HENT:      Mouth/Throat:      Mouth: Mucous membranes are moist.      Pharynx: Oropharynx is clear.   Eyes:      Extraocular Movements: Extraocular movements intact.      Conjunctiva/sclera: Conjunctivae normal.      Pupils: Pupils are equal, round, and reactive to light.   Cardiovascular:      Rate and Rhythm: Normal rate and regular rhythm.      Pulses: Normal pulses.      Heart sounds: Normal heart sounds.   Pulmonary:      Effort: Pulmonary effort is normal.      Breath sounds: Normal breath sounds.   Skin:     General: Skin is warm and dry.   Neurological:      General: No focal deficit present.      Mental Status: She is alert and oriented to person, place, and time.   Psychiatric:         Mood and Affect: Mood normal.       Fundal height: 25cm  FHT: 152     A/P:  1. 25w1d GESTATION  - Continue PNV & ASA 162mg   - reports noncompliance with ASA due to concerns for increased fetal movement  - 28 week labs (CBC, RPR, & 1hr GTT) provided to patient to be completed 1 week before next visit  - Cystic Fibrosis declined due to cost  - Labor precautions reviewed  - Fetal kick counts reviewed  - Ultrasounds: lvl 2 (): FG WNL, EFW 79%, MIRNA WNL, cervical length 3.85cm, anterior placenta no previa  - Delivery: vaginal  - Contraception: desires tubal ligation, will rediscuss and offer MA31  - RTO in 4 weeks    Problem List       21 weeks gestation of pregnancy    Left lower quadrant abdominal pain    Encounter for supervision of low-risk pregnancy in first trimester          Future Appointments   Date Time Provider Department Center   2024  8:15 AM Liban Milton MD SW COV FP [facility]   2024 10:00 AM  US 2 [facility]     Liban Milton MD  2024  8:12 AM

## 2024-05-15 DIAGNOSIS — Z34.91 ENCOUNTER FOR PREGNANCY RELATED EXAMINATION IN FIRST TRIMESTER: Primary | ICD-10-CM

## 2024-05-17 ENCOUNTER — APPOINTMENT (OUTPATIENT)
Dept: RADIOLOGY | Facility: HOSPITAL | Age: 34
End: 2024-05-17
Payer: COMMERCIAL

## 2024-05-17 ENCOUNTER — HOSPITAL ENCOUNTER (OUTPATIENT)
Facility: HOSPITAL | Age: 34
Setting detail: OBSERVATION
Discharge: HOME | End: 2024-05-18
Attending: SURGERY | Admitting: OBSTETRICS & GYNECOLOGY
Payer: COMMERCIAL

## 2024-05-17 ENCOUNTER — APPOINTMENT (EMERGENCY)
Dept: CT IMAGING | Facility: HOSPITAL | Age: 34
End: 2024-05-17
Payer: COMMERCIAL
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
## 2024-05-17 PROBLEM — Z3A.27 27 WEEKS GESTATION OF PREGNANCY: Status: ACTIVE | Noted: 2024-05-17

## 2024-05-17 LAB
ABO GROUP BLD: NORMAL
ALBUMIN SERPL BCP-MCNC: 3.4 G/DL (ref 3.5–5)
ALP SERPL-CCNC: 52 U/L (ref 34–104)
ALT SERPL W P-5'-P-CCNC: 20 U/L (ref 7–52)
ANION GAP SERPL CALCULATED.3IONS-SCNC: 8 MMOL/L (ref 4–13)
APTT PPP: 29 SECONDS (ref 23–37)
APTT PPP: 29 SECONDS (ref 23–37)
AST SERPL W P-5'-P-CCNC: 27 U/L (ref 13–39)
BASE EXCESS BLDA CALC-SCNC: -3 MMOL/L (ref -2–3)
BASOPHILS # BLD AUTO: 0.02 THOUSANDS/ÂΜL (ref 0–0.1)
BASOPHILS NFR BLD AUTO: 0 % (ref 0–1)
BILIRUB SERPL-MCNC: 0.29 MG/DL (ref 0.2–1)
BLD GP AB SCN SERPL QL: NEGATIVE
BUN SERPL-MCNC: 6 MG/DL (ref 5–25)
CA-I BLD-SCNC: 1.12 MMOL/L (ref 1.12–1.32)
CALCIUM ALBUM COR SERPL-MCNC: 8.6 MG/DL (ref 8.3–10.1)
CALCIUM SERPL-MCNC: 8.1 MG/DL (ref 8.4–10.2)
CHLORIDE SERPL-SCNC: 108 MMOL/L (ref 96–108)
CO2 SERPL-SCNC: 19 MMOL/L (ref 21–32)
CREAT SERPL-MCNC: 0.54 MG/DL (ref 0.6–1.3)
EOSINOPHIL # BLD AUTO: 0.03 THOUSAND/ÂΜL (ref 0–0.61)
EOSINOPHIL NFR BLD AUTO: 1 % (ref 0–6)
ERYTHROCYTE [DISTWIDTH] IN BLOOD BY AUTOMATED COUNT: 14.4 % (ref 11.6–15.1)
ERYTHROCYTE [DISTWIDTH] IN BLOOD BY AUTOMATED COUNT: 14.6 % (ref 11.6–15.1)
FIBRINOGEN PPP-MCNC: 336 MG/DL (ref 207–520)
FIBRINOGEN PPP-MCNC: 386 MG/DL (ref 207–520)
GFR SERPL CREATININE-BSD FRML MDRD: 125 ML/MIN/1.73SQ M
GLUCOSE SERPL-MCNC: 115 MG/DL (ref 65–140)
GLUCOSE SERPL-MCNC: 116 MG/DL (ref 65–140)
HCO3 BLDA-SCNC: 20.8 MMOL/L (ref 24–30)
HCT VFR BLD AUTO: 29.8 % (ref 34.8–46.1)
HCT VFR BLD AUTO: 32.9 % (ref 34.8–46.1)
HCT VFR BLD CALC: 30 % (ref 34.8–46.1)
HGB BLD-MCNC: 10.1 G/DL (ref 11.5–15.4)
HGB BLD-MCNC: 10.9 G/DL (ref 11.5–15.4)
HGB BLDA-MCNC: 10.2 G/DL (ref 11.5–15.4)
HOLD SPECIMEN: NORMAL
IMM GRANULOCYTES # BLD AUTO: 0.07 THOUSAND/UL (ref 0–0.2)
IMM GRANULOCYTES NFR BLD AUTO: 1 % (ref 0–2)
INR PPP: 0.92 (ref 0.84–1.19)
INR PPP: 0.93 (ref 0.84–1.19)
LYMPHOCYTES # BLD AUTO: 1.45 THOUSANDS/ÂΜL (ref 0.6–4.47)
LYMPHOCYTES NFR BLD AUTO: 22 % (ref 14–44)
MCH RBC QN AUTO: 30.2 PG (ref 26.8–34.3)
MCH RBC QN AUTO: 30.6 PG (ref 26.8–34.3)
MCHC RBC AUTO-ENTMCNC: 33.1 G/DL (ref 31.4–37.4)
MCHC RBC AUTO-ENTMCNC: 33.9 G/DL (ref 31.4–37.4)
MCV RBC AUTO: 90 FL (ref 82–98)
MCV RBC AUTO: 91 FL (ref 82–98)
MONOCYTES # BLD AUTO: 0.4 THOUSAND/ÂΜL (ref 0.17–1.22)
MONOCYTES NFR BLD AUTO: 6 % (ref 4–12)
NEUTROPHILS # BLD AUTO: 4.49 THOUSANDS/ÂΜL (ref 1.85–7.62)
NEUTS SEG NFR BLD AUTO: 70 % (ref 43–75)
NRBC BLD AUTO-RTO: 0 /100 WBCS
PCO2 BLD: 22 MMOL/L (ref 21–32)
PCO2 BLD: 31.3 MM HG (ref 42–50)
PH BLD: 7.43 [PH] (ref 7.3–7.4)
PLATELET # BLD AUTO: 203 THOUSANDS/UL (ref 149–390)
PLATELET # BLD AUTO: 208 THOUSANDS/UL (ref 149–390)
PMV BLD AUTO: 8.3 FL (ref 8.9–12.7)
PMV BLD AUTO: 8.8 FL (ref 8.9–12.7)
PO2 BLD: 28 MM HG (ref 35–45)
POTASSIUM BLD-SCNC: 3.6 MMOL/L (ref 3.5–5.3)
POTASSIUM SERPL-SCNC: 3.5 MMOL/L (ref 3.5–5.3)
PROT SERPL-MCNC: 6 G/DL (ref 6.4–8.4)
PROTHROMBIN TIME: 13 SECONDS (ref 11.6–14.5)
PROTHROMBIN TIME: 13.1 SECONDS (ref 11.6–14.5)
RBC # BLD AUTO: 3.3 MILLION/UL (ref 3.81–5.12)
RBC # BLD AUTO: 3.61 MILLION/UL (ref 3.81–5.12)
RH BLD: POSITIVE
SAO2 % BLD FROM PO2: 57 % (ref 60–85)
SODIUM BLD-SCNC: 136 MMOL/L (ref 136–145)
SODIUM SERPL-SCNC: 135 MMOL/L (ref 135–147)
SPECIMEN EXPIRATION DATE: NORMAL
SPECIMEN SOURCE: ABNORMAL
WBC # BLD AUTO: 6.46 THOUSAND/UL (ref 4.31–10.16)
WBC # BLD AUTO: 7.83 THOUSAND/UL (ref 4.31–10.16)

## 2024-05-17 PROCEDURE — 70450 CT HEAD/BRAIN W/O DYE: CPT

## 2024-05-17 PROCEDURE — 76817 TRANSVAGINAL US OBSTETRIC: CPT

## 2024-05-17 PROCEDURE — EDAIR PR ED AIR: Performed by: EMERGENCY MEDICINE

## 2024-05-17 PROCEDURE — 84132 ASSAY OF SERUM POTASSIUM: CPT

## 2024-05-17 PROCEDURE — 85730 THROMBOPLASTIN TIME PARTIAL: CPT

## 2024-05-17 PROCEDURE — 76815 OB US LIMITED FETUS(S): CPT

## 2024-05-17 PROCEDURE — 99284 EMERGENCY DEPT VISIT MOD MDM: CPT

## 2024-05-17 PROCEDURE — 80053 COMPREHEN METABOLIC PANEL: CPT | Performed by: SURGERY

## 2024-05-17 PROCEDURE — 71045 X-RAY EXAM CHEST 1 VIEW: CPT

## 2024-05-17 PROCEDURE — 85014 HEMATOCRIT: CPT

## 2024-05-17 PROCEDURE — 99222 1ST HOSP IP/OBS MODERATE 55: CPT | Performed by: OBSTETRICS & GYNECOLOGY

## 2024-05-17 PROCEDURE — 36415 COLL VENOUS BLD VENIPUNCTURE: CPT | Performed by: STUDENT IN AN ORGANIZED HEALTH CARE EDUCATION/TRAINING PROGRAM

## 2024-05-17 PROCEDURE — 85730 THROMBOPLASTIN TIME PARTIAL: CPT | Performed by: OBSTETRICS & GYNECOLOGY

## 2024-05-17 PROCEDURE — 86901 BLOOD TYPING SEROLOGIC RH(D): CPT | Performed by: SURGERY

## 2024-05-17 PROCEDURE — 99214 OFFICE O/P EST MOD 30 MIN: CPT

## 2024-05-17 PROCEDURE — 86900 BLOOD TYPING SEROLOGIC ABO: CPT | Performed by: SURGERY

## 2024-05-17 PROCEDURE — 85025 COMPLETE CBC W/AUTO DIFF WBC: CPT | Performed by: SURGERY

## 2024-05-17 PROCEDURE — 85027 COMPLETE CBC AUTOMATED: CPT | Performed by: OBSTETRICS & GYNECOLOGY

## 2024-05-17 PROCEDURE — 82803 BLOOD GASES ANY COMBINATION: CPT

## 2024-05-17 PROCEDURE — 86850 RBC ANTIBODY SCREEN: CPT | Performed by: SURGERY

## 2024-05-17 PROCEDURE — 82330 ASSAY OF CALCIUM: CPT

## 2024-05-17 PROCEDURE — 99205 OFFICE O/P NEW HI 60 MIN: CPT | Performed by: SURGERY

## 2024-05-17 PROCEDURE — 82947 ASSAY GLUCOSE BLOOD QUANT: CPT

## 2024-05-17 PROCEDURE — 72125 CT NECK SPINE W/O DYE: CPT

## 2024-05-17 PROCEDURE — 85610 PROTHROMBIN TIME: CPT

## 2024-05-17 PROCEDURE — 85384 FIBRINOGEN ACTIVITY: CPT | Performed by: OBSTETRICS & GYNECOLOGY

## 2024-05-17 PROCEDURE — 84295 ASSAY OF SERUM SODIUM: CPT

## 2024-05-17 PROCEDURE — NC001 PR NO CHARGE: Performed by: PHYSICIAN ASSISTANT

## 2024-05-17 PROCEDURE — 85384 FIBRINOGEN ACTIVITY: CPT

## 2024-05-17 PROCEDURE — 85610 PROTHROMBIN TIME: CPT | Performed by: OBSTETRICS & GYNECOLOGY

## 2024-05-17 PROCEDURE — NC001 PR NO CHARGE: Performed by: OBSTETRICS & GYNECOLOGY

## 2024-05-17 RX ORDER — ACETAMINOPHEN 325 MG/1
975 TABLET ORAL EVERY 8 HOURS PRN
Status: DISCONTINUED | OUTPATIENT
Start: 2024-05-17 | End: 2024-05-18 | Stop reason: HOSPADM

## 2024-05-17 RX ORDER — ONDANSETRON 2 MG/ML
4 INJECTION INTRAMUSCULAR; INTRAVENOUS EVERY 6 HOURS PRN
Status: DISCONTINUED | OUTPATIENT
Start: 2024-05-17 | End: 2024-05-18 | Stop reason: HOSPADM

## 2024-05-17 RX ORDER — CALCIUM CARBONATE 500 MG/1
1000 TABLET, CHEWABLE ORAL DAILY PRN
Status: DISCONTINUED | OUTPATIENT
Start: 2024-05-17 | End: 2024-05-18 | Stop reason: HOSPADM

## 2024-05-17 RX ADMIN — SODIUM CHLORIDE, SODIUM LACTATE, POTASSIUM CHLORIDE, AND CALCIUM CHLORIDE 1000 ML: .6; .31; .03; .02 INJECTION, SOLUTION INTRAVENOUS at 15:03

## 2024-05-17 RX ADMIN — ACETAMINOPHEN 975 MG: 325 TABLET, FILM COATED ORAL at 23:01

## 2024-05-17 RX ADMIN — ACETAMINOPHEN 975 MG: 325 TABLET, FILM COATED ORAL at 14:57
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
## 2024-05-17 NOTE — PLAN OF CARE
Problem: ANTEPARTUM  Goal: Maintain pregnancy as long as maternal and/or fetal condition is stable  Description: INTERVENTIONS:  - Maternal surveillance  - Fetal surveillance  - Monitor uterine activity  - Medications as ordered  - Bedrest  Outcome: Progressing     Problem: PAIN - ADULT  Goal: Verbalizes/displays adequate comfort level or baseline comfort level  Description: Interventions:  - Encourage patient to monitor pain and request assistance  - Assess pain using appropriate pain scale  - Administer analgesics based on type and severity of pain and evaluate response  - Implement non-pharmacological measures as appropriate and evaluate response  - Consider cultural and social influences on pain and pain management  - Notify physician/advanced practitioner if interventions unsuccessful or patient reports new pain  Outcome: Progressing     Problem: INFECTION - ADULT  Goal: Absence or prevention of progression during hospitalization  Description: INTERVENTIONS:  - Assess and monitor for signs and symptoms of infection  - Monitor lab/diagnostic results  - Monitor all insertion sites, i.e. indwelling lines, tubes, and drains  - Monitor endotracheal if appropriate and nasal secretions for changes in amount and color  - [place] appropriate cooling/warming therapies per order  - Administer medications as ordered  - Instruct and encourage patient and family to use good hand hygiene technique  - Identify and instruct in appropriate isolation precautions for identified infection/condition  Outcome: Progressing  Goal: Absence of fever/infection during neutropenic period  Description: INTERVENTIONS:  - Monitor WBC    Outcome: Progressing     Problem: SAFETY ADULT  Goal: Patient will remain free of falls  Description: INTERVENTIONS:  - Educate patient/family on patient safety including physical limitations  - Instruct patient to call for assistance with activity   - Consult OT/PT to assist with strengthening/mobility   -  Keep Call bell within reach  - Keep bed low and locked with side rails adjusted as appropriate  - Keep care items and personal belongings within reach  - Initiate and maintain comfort rounds  - Make Fall Risk Sign visible to staff  - Offer Toileting every  Hours, in advance of need  - Initiate/Maintain alarm  - Obtain necessary fall risk management equipment:   - Apply yellow socks and bracelet for high fall risk patients  - Consider moving patient to room near nurses station  Outcome: Progressing  Goal: Maintain or return to baseline ADL function  Description: INTERVENTIONS:  -  Assess patient's ability to carry out ADLs; assess patient's baseline for ADL function and identify physical deficits which impact ability to perform ADLs (bathing, care of mouth/teeth, toileting, grooming, dressing, etc.)  - Assess/evaluate cause of self-care deficits   - Assess range of motion  - Assess patient's mobility; develop plan if impaired  - Assess patient's need for assistive devices and provide as appropriate  - Encourage maximum independence but intervene and supervise when necessary  - Involve family in performance of ADLs  - Assess for home care needs following discharge   - Consider OT consult to assist with ADL evaluation and planning for discharge  - Provide patient education as appropriate  Outcome: Progressing  Goal: Maintains/Returns to pre admission functional level  Description: INTERVENTIONS:  - Perform AM-PAC 6 Click Basic Mobility/ Daily Activity assessment daily.  - Set and communicate daily mobility goal to care team and patient/family/caregiver.   - Collaborate with rehabilitation services on mobility goals if consulted  - Perform Range of Motion  times a day.  - Reposition patient every  hours.  - Dangle patient  times a day  - Stand patient  times a day  - Ambulate patient  times a day  - Out of bed to chair  times a day   - Out of bed for meals times a day  - Out of bed for toileting  - Record patient  progress and toleration of activity level   Outcome: Progressing     Problem: Knowledge Deficit  Goal: Patient/family/caregiver demonstrates understanding of disease process, treatment plan, medications, and discharge instructions  Description: Complete learning assessment and assess knowledge base.  Interventions:  - Provide teaching at level of understanding  - Provide teaching via preferred learning methods  Outcome: Progressing     Problem: DISCHARGE PLANNING  Goal: Discharge to home or other facility with appropriate resources  Description: INTERVENTIONS:  - Identify barriers to discharge w/patient and caregiver  - Arrange for needed discharge resources and transportation as appropriate  - Identify discharge learning needs (meds, wound care, etc.)  - Arrange for interpretive services to assist at discharge as needed  - Refer to Case Management Department for coordinating discharge planning if the patient needs post-hospital services based on physician/advanced practitioner order or complex needs related to functional status, cognitive ability, or social support system  Outcome: Progressing

## 2024-05-17 NOTE — QUICK NOTE
Cervical Collar Clearance:    The patient had a CT scan of the cervical spine demonstrating no acute injury. On exam, the patient had no midline point tenderness or paresthesias/numbness/weakness in the extremities. The patient had full range of motion (was then able to flex, extend, and rotate head laterally) without pain. There were no distracting injuries and the patient was not intoxicated.      The patient's cervical spine was cleared radiologically and clinically. Cervical collar removed at this time.     Onofre Barboza PA-C  5/17/2024 10:46 AM

## 2024-05-17 NOTE — ASSESSMENT & PLAN NOTE
Up to date with prenatal care, uncomplicated thus far  28 week labs ordered  Baby currently transverse with head on maternal right  Fetal monitoring reassuring  Reg diet

## 2024-05-17 NOTE — PLAN OF CARE
Problem: ANTEPARTUM  Goal: Maintain pregnancy as long as maternal and/or fetal condition is stable  Description: INTERVENTIONS:  - Maternal surveillance  - Fetal surveillance  - Monitor uterine activity  - Medications as ordered  - Bedrest  Outcome: Progressing     Problem: PAIN - ADULT  Goal: Verbalizes/displays adequate comfort level or baseline comfort level  Description: Interventions:  - Encourage patient to monitor pain and request assistance  - Assess pain using appropriate pain scale  - Administer analgesics based on type and severity of pain and evaluate response  - Implement non-pharmacological measures as appropriate and evaluate response  - Consider cultural and social influences on pain and pain management  - Notify physician/advanced practitioner if interventions unsuccessful or patient reports new pain  Outcome: Progressing     Problem: INFECTION - ADULT  Goal: Absence or prevention of progression during hospitalization  Description: INTERVENTIONS:  - Assess and monitor for signs and symptoms of infection  - Monitor lab/diagnostic results  - Monitor all insertion sites, i.e. indwelling lines, tubes, and drains  - Monitor endotracheal if appropriate and nasal secretions for changes in amount and color  - [place] appropriate cooling/warming therapies per order  - Administer medications as ordered  - Instruct and encourage patient and family to use good hand hygiene technique  - Identify and instruct in appropriate isolation precautions for identified infection/condition  Outcome: Progressing  Goal: Absence of fever/infection during neutropenic period  Description: INTERVENTIONS:  - Monitor WBC    Outcome: Progressing     Problem: SAFETY ADULT  Goal: Patient will remain free of falls  Description: INTERVENTIONS:  - Educate patient/family on patient safety including physical limitations  - Instruct patient to call for assistance with activity   - Consult OT/PT to assist with strengthening/mobility   -  Keep Call bell within reach  - Keep bed low and locked with side rails adjusted as appropriate  - Keep care items and personal belongings within reach  - Initiate and maintain comfort rounds  - Make Fall Risk Sign visible to staff  - Offer Toileting every  Hours, in advance of need  - Initiate/Maintain alarm  - Obtain necessary fall risk management equipment:   - Apply yellow socks and bracelet for high fall risk patients  - Consider moving patient to room near nurses station  Outcome: Progressing  Goal: Maintain or return to baseline ADL function  Description: INTERVENTIONS:  -  Assess patient's ability to carry out ADLs; assess patient's baseline for ADL function and identify physical deficits which impact ability to perform ADLs (bathing, care of mouth/teeth, toileting, grooming, dressing, etc.)  - Assess/evaluate cause of self-care deficits   - Assess range of motion  - Assess patient's mobility; develop plan if impaired  - Assess patient's need for assistive devices and provide as appropriate  - Encourage maximum independence but intervene and supervise when necessary  - Involve family in performance of ADLs  - Assess for home care needs following discharge   - Consider OT consult to assist with ADL evaluation and planning for discharge  - Provide patient education as appropriate  Outcome: Progressing  Goal: Maintains/Returns to pre admission functional level  Description: INTERVENTIONS:  - Perform AM-PAC 6 Click Basic Mobility/ Daily Activity assessment daily.  - Set and communicate daily mobility goal to care team and patient/family/caregiver.   - Collaborate with rehabilitation services on mobility goals if consulted  - Perform Range of Motion times a day.  - Reposition patient every  hours.  - Dangle patient  times a day  - Stand patient times a day  - Ambulate patient  times a day  - Out of bed to chair  times a day   - Out of bed for meals  times a day  - Out of bed for toileting  - Record patient  progress and toleration of activity level   Outcome: Progressing     Problem: Knowledge Deficit  Goal: Patient/family/caregiver demonstrates understanding of disease process, treatment plan, medications, and discharge instructions  Description: Complete learning assessment and assess knowledge base.  Interventions:  - Provide teaching at level of understanding  - Provide teaching via preferred learning methods  Outcome: Progressing     Problem: DISCHARGE PLANNING  Goal: Discharge to home or other facility with appropriate resources  Description: INTERVENTIONS:  - Identify barriers to discharge w/patient and caregiver  - Arrange for needed discharge resources and transportation as appropriate  - Identify discharge learning needs (meds, wound care, etc.)  - Arrange for interpretive services to assist at discharge as needed  - Refer to Case Management Department for coordinating discharge planning if the patient needs post-hospital services based on physician/advanced practitioner order or complex needs related to functional status, cognitive ability, or social support system  Outcome: Progressing
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
## 2024-05-17 NOTE — PROCEDURES
Kameron Ashrafkatty, a  at 27w4d with an CARMEN of 2024, by Ultrasound, was seen at [facility] for the following procedure(s): $Procedure Type: JOSE ELIAS, US - Transvaginal]         4 Quadrant JOSE ELIAS  JOSE ELIAS Q1 (cm): 5.1 cm  JOSE ELIAS Q2 (cm): 6.2 cm  JOSE ELIAS Q3 (cm): 5.6 cm  JOSE ELIAS Q4 (cm): 1.9 cm  JOSE ELIAS TOTAL (cm): 18.8 cm         Ultrasound Other  Fetal Presentation: Transverse  Cervical Length: 3.41  Funnel: No  Debris: No  Placenta Previa: No  Vasa Previa: No

## 2024-05-18 VITALS
WEIGHT: 192.24 LBS | TEMPERATURE: 98.2 F | SYSTOLIC BLOOD PRESSURE: 98 MMHG | RESPIRATION RATE: 16 BRPM | BODY MASS INDEX: 32.82 KG/M2 | HEIGHT: 64 IN | DIASTOLIC BLOOD PRESSURE: 77 MMHG | HEART RATE: 96 BPM | OXYGEN SATURATION: 98 %

## 2024-05-18 LAB — HOLD SPECIMEN: NORMAL

## 2024-05-18 PROCEDURE — 99232 SBSQ HOSP IP/OBS MODERATE 35: CPT | Performed by: SURGERY

## 2024-05-18 RX ADMIN — ACETAMINOPHEN 975 MG: 325 TABLET, FILM COATED ORAL at 08:31

## 2024-05-18 NOTE — PLAN OF CARE
Problem: ANTEPARTUM  Goal: Maintain pregnancy as long as maternal and/or fetal condition is stable  Description: INTERVENTIONS:  - Maternal surveillance  - Fetal surveillance  - Monitor uterine activity  - Medications as ordered  - Bedrest  Outcome: Completed     Problem: PAIN - ADULT  Goal: Verbalizes/displays adequate comfort level or baseline comfort level  Description: Interventions:  - Encourage patient to monitor pain and request assistance  - Assess pain using appropriate pain scale  - Administer analgesics based on type and severity of pain and evaluate response  - Implement non-pharmacological measures as appropriate and evaluate response  - Consider cultural and social influences on pain and pain management  - Notify physician/advanced practitioner if interventions unsuccessful or patient reports new pain  Outcome: Completed     Problem: INFECTION - ADULT  Goal: Absence or prevention of progression during hospitalization  Description: INTERVENTIONS:  - Assess and monitor for signs and symptoms of infection  - Monitor lab/diagnostic results  - Monitor all insertion sites, i.e. indwelling lines, tubes, and drains  - Monitor endotracheal if appropriate and nasal secretions for changes in amount and color  - [place] appropriate cooling/warming therapies per order  - Administer medications as ordered  - Instruct and encourage patient and family to use good hand hygiene technique  - Identify and instruct in appropriate isolation precautions for identified infection/condition  Outcome: Completed  Goal: Absence of fever/infection during neutropenic period  Description: INTERVENTIONS:  - Monitor WBC    Outcome: Completed     Problem: SAFETY ADULT  Goal: Patient will remain free of falls  Description: INTERVENTIONS:  - Educate patient/family on patient safety including physical limitations  - Instruct patient to call for assistance with activity   - Consult OT/PT to assist with strengthening/mobility   - Keep  Call bell within reach  - Keep bed low and locked with side rails adjusted as appropriate  - Keep care items and personal belongings within reach  - Initiate and maintain comfort rounds  - Make Fall Risk Sign visible to staff  - Apply yellow socks and bracelet for high fall risk patients  - Consider moving patient to room near nurses station  Outcome: Completed  Goal: Maintain or return to baseline ADL function  Description: INTERVENTIONS:  -  Assess patient's ability to carry out ADLs; assess patient's baseline for ADL function and identify physical deficits which impact ability to perform ADLs (bathing, care of mouth/teeth, toileting, grooming, dressing, etc.)  - Assess/evaluate cause of self-care deficits   - Assess range of motion  - Assess patient's mobility; develop plan if impaired  - Assess patient's need for assistive devices and provide as appropriate  - Encourage maximum independence but intervene and supervise when necessary  - Involve family in performance of ADLs  - Assess for home care needs following discharge   - Consider OT consult to assist with ADL evaluation and planning for discharge  - Provide patient education as appropriate  Outcome: Completed  Goal: Maintains/Returns to pre admission functional level  Description: INTERVENTIONS:  - Perform AM-PAC 6 Click Basic Mobility/ Daily Activity assessment daily.  - Set and communicate daily mobility goal to care team and patient/family/caregiver.   - Collaborate with rehabilitation services on mobility goals if consulted  - Out of bed for toileting  - Record patient progress and toleration of activity level   Outcome: Completed     Problem: Knowledge Deficit  Goal: Patient/family/caregiver demonstrates understanding of disease process, treatment plan, medications, and discharge instructions  Description: Complete learning assessment and assess knowledge base.  Interventions:  - Provide teaching at level of understanding  - Provide teaching via  preferred learning methods  Outcome: Completed     Problem: DISCHARGE PLANNING  Goal: Discharge to home or other facility with appropriate resources  Description: INTERVENTIONS:  - Identify barriers to discharge w/patient and caregiver  - Arrange for needed discharge resources and transportation as appropriate  - Identify discharge learning needs (meds, wound care, etc.)  - Arrange for interpretive services to assist at discharge as needed  - Refer to Case Management Department for coordinating discharge planning if the patient needs post-hospital services based on physician/advanced practitioner order or complex needs related to functional status, cognitive ability, or social support system  Outcome: Completed
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
## 2024-05-21 ENCOUNTER — APPOINTMENT (OUTPATIENT)
Dept: LAB | Facility: HOSPITAL | Age: 34
End: 2024-05-21
Payer: SUBSIDIZED

## 2024-05-21 DIAGNOSIS — Z34.91 ENCOUNTER FOR PREGNANCY RELATED EXAMINATION IN FIRST TRIMESTER: ICD-10-CM

## 2024-05-21 DIAGNOSIS — Z34.02 ENCOUNTER FOR PRENATAL CARE IN SECOND TRIMESTER OF FIRST PREGNANCY: ICD-10-CM

## 2024-05-21 LAB
BASOPHILS # BLD AUTO: 0.01 THOUSANDS/ÂΜL (ref 0–0.1)
BASOPHILS NFR BLD AUTO: 0 % (ref 0–1)
EOSINOPHIL # BLD AUTO: 0.01 THOUSAND/ÂΜL (ref 0–0.61)
EOSINOPHIL NFR BLD AUTO: 0 % (ref 0–6)
ERYTHROCYTE [DISTWIDTH] IN BLOOD BY AUTOMATED COUNT: 14.5 % (ref 11.6–15.1)
GLUCOSE 1H P 50 G GLC PO SERPL-MCNC: 129 MG/DL (ref 70–134)
HCT VFR BLD AUTO: 33.6 % (ref 34.8–46.1)
HGB BLD-MCNC: 11 G/DL (ref 11.5–15.4)
IMM GRANULOCYTES # BLD AUTO: 0.03 THOUSAND/UL (ref 0–0.2)
IMM GRANULOCYTES NFR BLD AUTO: 1 % (ref 0–2)
LYMPHOCYTES # BLD AUTO: 0.98 THOUSANDS/ÂΜL (ref 0.6–4.47)
LYMPHOCYTES NFR BLD AUTO: 16 % (ref 14–44)
MCH RBC QN AUTO: 29.8 PG (ref 26.8–34.3)
MCHC RBC AUTO-ENTMCNC: 32.7 G/DL (ref 31.4–37.4)
MCV RBC AUTO: 91 FL (ref 82–98)
MONOCYTES # BLD AUTO: 0.21 THOUSAND/ÂΜL (ref 0.17–1.22)
MONOCYTES NFR BLD AUTO: 4 % (ref 4–12)
NEUTROPHILS # BLD AUTO: 4.79 THOUSANDS/ÂΜL (ref 1.85–7.62)
NEUTS SEG NFR BLD AUTO: 79 % (ref 43–75)
NRBC BLD AUTO-RTO: 0 /100 WBCS
PLATELET # BLD AUTO: 199 THOUSANDS/UL (ref 149–390)
PMV BLD AUTO: 8.6 FL (ref 8.9–12.7)
RBC # BLD AUTO: 3.69 MILLION/UL (ref 3.81–5.12)
WBC # BLD AUTO: 6.03 THOUSAND/UL (ref 4.31–10.16)

## 2024-05-21 PROCEDURE — 85025 COMPLETE CBC W/AUTO DIFF WBC: CPT

## 2024-05-21 PROCEDURE — 86780 TREPONEMA PALLIDUM: CPT

## 2024-05-21 PROCEDURE — 82950 GLUCOSE TEST: CPT

## 2024-05-21 PROCEDURE — 36415 COLL VENOUS BLD VENIPUNCTURE: CPT

## 2024-05-22 LAB — TREPONEMA PALLIDUM IGG+IGM AB [PRESENCE] IN SERUM OR PLASMA BY IMMUNOASSAY: NORMAL

## 2024-05-27 PROBLEM — Z3A.29 29 WEEKS GESTATION OF PREGNANCY: Status: ACTIVE | Noted: 2024-01-29

## 2024-05-28 ENCOUNTER — APPOINTMENT (OUTPATIENT)
Dept: LAB | Facility: HOSPITAL | Age: 34
End: 2024-05-28
Payer: SUBSIDIZED

## 2024-05-28 ENCOUNTER — ROUTINE PRENATAL (OUTPATIENT)
Age: 34
End: 2024-05-28

## 2024-05-28 VITALS
DIASTOLIC BLOOD PRESSURE: 71 MMHG | OXYGEN SATURATION: 99 % | HEART RATE: 117 BPM | TEMPERATURE: 98.2 F | SYSTOLIC BLOOD PRESSURE: 117 MMHG

## 2024-05-28 DIAGNOSIS — Z34.91 ENCOUNTER FOR PREGNANCY RELATED EXAMINATION IN FIRST TRIMESTER: ICD-10-CM

## 2024-05-28 DIAGNOSIS — Z34.91 ENCOUNTER FOR SUPERVISION OF LOW-RISK PREGNANCY IN FIRST TRIMESTER: Primary | ICD-10-CM

## 2024-05-28 DIAGNOSIS — N39.0 URINARY TRACT INFECTION WITHOUT HEMATURIA, SITE UNSPECIFIED: ICD-10-CM

## 2024-05-28 DIAGNOSIS — M54.31 BILATERAL SCIATICA: ICD-10-CM

## 2024-05-28 DIAGNOSIS — Z23 ENCOUNTER FOR IMMUNIZATION: ICD-10-CM

## 2024-05-28 DIAGNOSIS — M54.32 BILATERAL SCIATICA: ICD-10-CM

## 2024-05-28 DIAGNOSIS — Z3A.29 29 WEEKS GESTATION OF PREGNANCY: ICD-10-CM

## 2024-05-28 LAB
SL AMB  POCT GLUCOSE, UA: NEGATIVE
SL AMB LEUKOCYTE ESTERASE,UA: ABNORMAL
SL AMB POCT BILIRUBIN,UA: NEGATIVE
SL AMB POCT BLOOD,UA: NEGATIVE
SL AMB POCT CLARITY,UA: ABNORMAL
SL AMB POCT COLOR,UA: YELLOW
SL AMB POCT KETONES,UA: NEGATIVE
SL AMB POCT NITRITE,UA: NEGATIVE
SL AMB POCT PH,UA: 7
SL AMB POCT SPECIFIC GRAVITY,UA: 1.01
SL AMB POCT URINE PROTEIN: NEGATIVE
SL AMB POCT UROBILINOGEN: 0.2

## 2024-05-28 PROCEDURE — 90715 TDAP VACCINE 7 YRS/> IM: CPT | Performed by: OBSTETRICS & GYNECOLOGY

## 2024-05-28 PROCEDURE — 81329 SMN1 GENE DOS/DELETION ALYS: CPT

## 2024-05-28 PROCEDURE — 90471 IMMUNIZATION ADMIN: CPT | Performed by: OBSTETRICS & GYNECOLOGY

## 2024-05-28 PROCEDURE — 81220 CFTR GENE COM VARIANTS: CPT

## 2024-05-28 PROCEDURE — 36415 COLL VENOUS BLD VENIPUNCTURE: CPT

## 2024-05-28 PROCEDURE — 81003 URINALYSIS AUTO W/O SCOPE: CPT | Performed by: OBSTETRICS & GYNECOLOGY

## 2024-05-28 PROCEDURE — PNV: Performed by: OBSTETRICS & GYNECOLOGY
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
## 2024-05-30 LAB
BACTERIA UR CULT: NORMAL
Lab: NO GROWTH

## 2024-06-06 LAB
CITATION REF LAB TEST: ABNORMAL
CLINICAL INFO: ABNORMAL
ETHNIC BACKGROUND STATED: ABNORMAL
GENE DIS ANL CARRIER INTERP-IMP: ABNORMAL
GENE MUT TESTED BLD/T: ABNORMAL
LAB DIRECTOR NAME PROVIDER: ABNORMAL
REASON FOR REFERRAL (NARRATIVE): ABNORMAL
RECOMMENDATION PATIENT DOC-IMP: ABNORMAL
REF LAB TEST METHOD: ABNORMAL
SERVICE CMNT-IMP: ABNORMAL
SMN1 GENE MUT ANL BLD/T: ABNORMAL
SPECIMEN SOURCE: ABNORMAL

## 2024-06-10 LAB
CFTR FULL MUT ANL BLD/T SEQ: NORMAL
CITATION REF LAB TEST: NORMAL
CLINICAL INFO: NORMAL
ETHNIC BACKGROUND STATED: NORMAL
GENE DIS ANL CARRIER INTERP-IMP: NORMAL
INDICATION: NORMAL
LAB DIRECTOR NAME PROVIDER: NORMAL
RECOMMENDATION PATIENT DOC-IMP: NORMAL
REF LAB TEST METHOD: NORMAL
SERVICE CMNT-IMP: NORMAL
SPECIMEN SOURCE: NORMAL

## 2024-06-11 ENCOUNTER — ROUTINE PRENATAL (OUTPATIENT)
Age: 34
End: 2024-06-11

## 2024-06-11 VITALS
WEIGHT: 169 LBS | DIASTOLIC BLOOD PRESSURE: 71 MMHG | BODY MASS INDEX: 28.85 KG/M2 | TEMPERATURE: 98 F | RESPIRATION RATE: 16 BRPM | OXYGEN SATURATION: 99 % | HEART RATE: 104 BPM | HEIGHT: 64 IN | SYSTOLIC BLOOD PRESSURE: 108 MMHG

## 2024-06-11 DIAGNOSIS — Z34.03 ENCOUNTER FOR SUPERVISION OF NORMAL FIRST PREGNANCY IN THIRD TRIMESTER: Primary | ICD-10-CM

## 2024-06-11 DIAGNOSIS — Z14.8 CARRIER OF SPINAL MUSCULAR ATROPHY: ICD-10-CM

## 2024-06-11 PROCEDURE — PNV: Performed by: OBSTETRICS & GYNECOLOGY
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
## 2024-06-18 ENCOUNTER — ULTRASOUND (OUTPATIENT)
Facility: HOSPITAL | Age: 34
End: 2024-06-18

## 2024-06-18 ENCOUNTER — OFFICE VISIT (OUTPATIENT)
Facility: HOSPITAL | Age: 34
End: 2024-06-18

## 2024-06-18 VITALS
HEIGHT: 64 IN | SYSTOLIC BLOOD PRESSURE: 116 MMHG | BODY MASS INDEX: 28.79 KG/M2 | DIASTOLIC BLOOD PRESSURE: 72 MMHG | WEIGHT: 168.6 LBS | HEART RATE: 88 BPM

## 2024-06-18 DIAGNOSIS — Z36.2 ENCOUNTER FOR OTHER ANTENATAL SCREENING FOLLOW-UP: Primary | ICD-10-CM

## 2024-06-18 DIAGNOSIS — Z31.430 ENCOUNTER OF FEMALE FOR TESTING FOR GENETIC DISEASE CARRIER STATUS FOR PROCREATIVE MANAGEMENT: Primary | ICD-10-CM

## 2024-06-18 DIAGNOSIS — Z31.5 ENCOUNTER FOR PROCREATIVE GENETIC COUNSELING: ICD-10-CM

## 2024-06-18 DIAGNOSIS — Z36.0 ENCOUNTER FOR ANTENATAL SCREENING FOR CHROMOSOMAL ANOMALIES: ICD-10-CM

## 2024-06-18 DIAGNOSIS — Z14.8 CARRIER OF SPINAL MUSCULAR ATROPHY: ICD-10-CM

## 2024-06-18 DIAGNOSIS — Z3A.32 32 WEEKS GESTATION OF PREGNANCY: ICD-10-CM

## 2024-06-18 PROCEDURE — 36415 COLL VENOUS BLD VENIPUNCTURE: CPT | Performed by: OBSTETRICS & GYNECOLOGY

## 2024-06-18 PROCEDURE — 99212 OFFICE O/P EST SF 10 MIN: CPT | Performed by: OBSTETRICS & GYNECOLOGY

## 2024-06-18 PROCEDURE — 76816 OB US FOLLOW-UP PER FETUS: CPT | Performed by: OBSTETRICS & GYNECOLOGY

## 2024-06-18 PROCEDURE — NC001 PR NO CHARGE

## 2024-06-18 NOTE — PROGRESS NOTES
Patient chose to have CH4e Carrier Screen and NIPT.       Blood collection tubes labeled with patient identifiers (name, medical record number, and date of birth).     Included [facility] order form in collection kit. Patient chose to have blood drawn in our office at time of visit. NIPS was drawn from left arm with a butterfly needle by JOSELYN Zazueta MA.       If patient chose to have blood work drawn at a [facility] lab we requested patient notify MFM (via phone call or [employer] message) when blood collected so office can follow up on results.       Maternal Fetal Medicine will have results in approximately 5-7 business days and will call patient or notify via [employer].  Patient aware viewing lab result online will reveal fetal sex if ordered.    Patient verbalized understanding of all instructions and no questions at this time.      [Normal Growth] : growth [Normal Development] : development [None] : No known medical problems [No Elimination Concerns] : elimination [No feeding Concerns] : feeding [No Skin Concerns] : skin [Normal Sleep Pattern] : sleep [Physical Growth and Development] : physical growth and development [Social and Academic Competence] : social and academic competence [Emotional Well-Being] : emotional well-being [Risk Reduction] : risk reduction [Violence and Injury Prevention] : violence and injury prevention [No Medications] : ~He/She~ is not on any medications [Patient] : patient [Mother] : mother [FreeTextEntry1] : Sravan demonstrates excellent growth and development. His physical examination is unremarkable. His will attend college at the [employer] in the fall. He received the Hep A and Bexsero Vaccinations. He will return in one year. the U/A was wnl.

## 2024-06-18 NOTE — PROGRESS NOTES
The patient was seen today for an ultrasound.  Please see ultrasound report (located under Ob Procedures) for additional details.   Thank you very much for allowing us to participate in the care of this very nice patient.  Should you have any questions, please do not hesitate to contact me.     Kody Perez MD FACOG  Attending Physician, Maternal-Fetal Medicine  [facility]

## 2024-06-18 NOTE — PROGRESS NOTES
Genetic Counseling Note    Appointment Date:  2024  Referred By: Isabella Chapa, *  YOB: 1990  Partner:  Not involved in pregnancy  Indication for Visit:   spinal muscular atrophy carrier  Pregnancy History:   Estimated Date of Delivery: 2024  Estimated Gestational Age: 32w1d    Inderjit is a 34 y.o. female who presented for genetic counseling to discuss her positive carrier screening result for spinal muscular atrophy. [employer]  #[id number] was used during the visit. Emotional support was provided throughout the session.    We reviewed that spinal muscular atrophy (SMA) is characterized by progressive muscle weakness resulting from degeneration and loss of the anterior horn cells (i.e., lower motor neurons) in the spinal cord and the brain stem nuclei. Onset ranges from before birth to adolescence or young adulthood. We reviewed the diagnosis and prognosis, including increased risk for morbidity and mortality based on type of SMA. We discussed that multiple targeted treatments are now available which can significantly improve both quality of life and projected lifespan for affected individuals. We reviewed  screening in the [facility] for SMA. We also reviewed the autosomal recessive inheritance pattern. Should the father of the pregnancy be a carrier, the pregnancy is at 25% risk to be affected. Inderjit reports that the father of her pregnancy is not available for testing. Dairy is understandably concerned about the possibility for her baby to have SMA and does not want to wait until after birth for more information. We reviewed that based on her ex's reported ancestry ([place]), his risk to be a carrier is 1/117, or less than 1%. If both parents are carriers, the risk for affected offspring is 1/4 per pregnancy. Thus with our current understanding, the risk for Inderjit's pregnancy to be affected with SMA is 1/117 x 1/4, or 1/468 (0.21%);  conversely there is a 467/468 (99.79%) chance that the pregnancy is unaffected. The patient was reassured by this risk estimate but would still like to pursue some type of prenatal testing.     We discussed that amniocentesis is available to provide more insight into the risk for SMA in the pregnancy even without knowledge of paternal carrier status. The risks, benefits, and limitations of amniocentesis were discussed with the patient. Amniocentesis is performed starting at 16 weeks gestation, using direct real time ultrasound visualization to avoid both the fetus and the placenta. Once amniotic fluid is withdrawn, laboratory analysis is performed and amniotic fluid alpha-fetoprotein, as well as chromosome and/or microarray analysis is undertaken. The risk of genetic amniocentesis includes, but is not limited to less than 1 in 300 pregnancy loss rate or  delivery rate if 23 weeks or greater, infection, bleeding, rupture of membranes, failure of cells to grow, karyotype error, laboratory error, etc. Occasionally a repeat amniocentesis is necessary due to cell culture failure. Chromosome/microarray analysis from amniocentesis is 99.9% accurate and alpha-fetoprotein analysis can detect approximately 95% of open neural tube defects. Molecular testing for single gene disorders such as SMA is available once a sample has been obtained. We discussed that SMN2 copy number analysis, if appropriate, can provide insight into the expected phenotype. We reviewed the expected turnaround time of 1-2 weeks for fetal SMA testing via amniocentesis.     We also discussed the availability of [place] single gene NIPT blood work for Dairy. This blood work can screen for both aneuploidy and single gene disorders including beta-globinopathies, alpha thalassemia, cystic fibrosis, and spinal muscular atrophy without needing a paternal sample or known paternal carrier status. We discussed that the test is designed to first screen the  "pregnant individual for pathogenic variants, then provide a fetal risk estimate for any relevant conditions, expressed as a probability (e.g. \"1 in 2 chance of being affected\" or \"less than a 1 in 3200 chance\"). This approach lacks the accuracy of diagnostic testing but does provide some information about fetal risk without increasing the risk of miscarriage or  birth. As Dairy has not yet completed carrier screening for hemoglobinopathies, the [place] single gene NIPT would replace these tests and provide a fetal risk estimate if appropriate, in addition to sreening the fetus for SMA. Expected turnaround time is 2-3 weeks.    We discussed Dairy's testing options at length as well as the actionability thereof. Inderjit reports that if she knew with certainty that her baby had SMA, she would consider pregnancy termination. We reviewed again the availability of targeted treatments for affected children; Dairy understands this, but believes she might prefer to terminate rather than take any risk of her child having a serious medical condition. We reviewed the availability of a consult with specialists in the event her pregnancy is affected with SMA, so that she could receive the best possible information about  prognosis and treatment. We discussed that we would not advise terminating a pregnancy based on a screening test ([place] NIPT) only, in which case it may be best to proceed directly to amniocentesis. Alternately, she could have [place] NIPT first, and pursue amniocentesis only in the event of a positive/high risk result. We also discussed the difficulty of accessing pregnancy termination services in the third trimester. The gestational age limit for pregnancy termination in [place] is 23w6d. At Inderjit's current gestational age of 32w1d it would be very difficult to access this care, and it will only become more difficult as her pregnancy progresses; from a practical standpoint it may be impossible " now, or become impossible at a later point in pregnancy. Even if she were to pursue amniocentesis immediately, she would not get the answer for a minimum of 1-2 weeks. Moreover, laboratory processing delays or a stepwise testing plan as described above could mean she would not get an answer until she is nearly full term. Therefore although immediate amniocentesis would give Inderjit the best chance of being able to terminate in the event of an affected pregnancy, it is highly possible this care would be inaccessible no matter which testing option(s) she chooses.     After extensive discussion of the available testing options, Inderjit elected [place] NIPT at this time, which was collected after her genetic counseling visit today. She understands the limitations of the information it will provide and the previously described issues with timing, gestational age, and the prospect of pregnancy termination. She may pursue amniocentesis in the event her test result is positive/high risk.    Inderjit previously had a level II anatomy ultrasound on 4/16/2024 which was not concerning for structural anomalies or increased risk for aneuploidy. A marginal cord insertion was visualized, leading to the recommendation of a growth ultrasound at 32 weeks gestation (scheduled today). She has not had the opportunity to discuss aneuploidy in pregnancy before now, so we briefly reviewed this information. The risk of Down syndrome at age 34 at delivery is 1/535, and the risk for any chromosomal abnormality at this age is 1/286. We discussed that the [place] NIPT may also screen for aneuploidy, if desired; however I stressed that there is no particular reason to suspect aneuploidy in this pregnancy and she does not need to pursue screening for it if she does not want to. We reviewed the benefits and limitations of NIPT in detecting Down syndrome, Trisomy 13, Trisomy 18 and sex chromosome aneuploidies. We also discussed that just as with SMA, Dairy  would likely not be able to pursue pregnancy termination in the event a high risk for aneuploidy were identified (and that a positive result would need to be confirmed via amniocentesis). However, consults would be available to discuss  management. The patient understands these limitations and elected to add aneuploidy screening to the [place] NIPT.    Due to the nature of the session and limitations of time, we did not discuss family history during the visit. We briefly reviewed the availability of expanded carrier screening, which Dairy declined.    Lastly, we discussed the fact that everyone in the general population regardless of age, family history, or medical background has approximately a 3-5% risk of having a child with some type of congenital anomaly, genetic disease or intellectual disability. Currently there are no tests available to rule out all birth defects or health problems.    Dairy was provided with our contact information. I encouraged her to call with any questions or concerns.    Plan/Tests Ordered:  1) Patient declined amniocentesis and expanded carrier screening at this time.  2) Patient elected [place] NIPT for single gene conditions and aneuploidy, collected after visit.  3) Follow-up growth ultrasound scheduled immediately after visit on 2024 at [facility] site.    Time spent with Genetic Counselor: 105 minutes

## 2024-06-25 ENCOUNTER — ROUTINE PRENATAL (OUTPATIENT)
Age: 34
End: 2024-06-25

## 2024-06-25 VITALS
SYSTOLIC BLOOD PRESSURE: 102 MMHG | DIASTOLIC BLOOD PRESSURE: 62 MMHG | BODY MASS INDEX: 29.02 KG/M2 | HEIGHT: 64 IN | OXYGEN SATURATION: 99 % | TEMPERATURE: 97.7 F | WEIGHT: 170 LBS | HEART RATE: 87 BPM

## 2024-06-25 DIAGNOSIS — Z3A.33 33 WEEKS GESTATION OF PREGNANCY: Primary | ICD-10-CM

## 2024-06-25 PROCEDURE — PNV: Performed by: OBSTETRICS & GYNECOLOGY
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
## 2024-06-27 ENCOUNTER — TELEPHONE (OUTPATIENT)
Facility: HOSPITAL | Age: 34
End: 2024-06-27

## 2024-06-27 NOTE — TELEPHONE ENCOUNTER
I called Inderjit at [phone] with the help of [employer] [place]  Carol Ann, #[id number]. Confirmed her . We discussed the results of her [place] NIPT aneuploidy screening, which returned as low risk. We reviewed that this is reassuring news and no further follow-up is required. We also discussed that the results Inderjit is most concerned about, relating to the fetal risk for spinal muscular atrophy, have not yet returned. She was disappointed by this but understands. The expected turnaround time for that portion of the test is 2-3 weeks, so it should be back either next week or the week after. I asked Inderjit how she had been feeling since our visit last week; she said she is feeling a little bit better, but is still worried about the chance for her baby to have SMA. I reassured her that we will contact her as soon as possible when those results have been received. Inderjit thanked me for my call and had no further questions at this time.    Oliva Haskins, CGC

## 2024-07-08 ENCOUNTER — TELEPHONE (OUTPATIENT)
Facility: HOSPITAL | Age: 34
End: 2024-07-08

## 2024-07-08 NOTE — TELEPHONE ENCOUNTER
Called patient and confirmed date of birth. [employer] [place]  Eduardo, #[id number], was used throughout the call. Discussed [place] screening results assessing risk of spinal muscular atrophy, hemoglobinopathies, and cystic fibrosis. Reviewed that patient was not identified to be a carrier for anything other than spinal muscular atrophy (SMA),  which was previously known. Discussed that fetal risk for SMA was determined to be LOW, at 1/4000. Dairy was very happy to hear this information and had no further questions.      Oliva Haskins, CGC

## 2024-07-09 ENCOUNTER — ROUTINE PRENATAL (OUTPATIENT)
Age: 34
End: 2024-07-09

## 2024-07-09 ENCOUNTER — APPOINTMENT (OUTPATIENT)
Dept: LAB | Facility: HOSPITAL | Age: 34
End: 2024-07-09
Payer: COMMERCIAL

## 2024-07-09 VITALS
OXYGEN SATURATION: 99 % | HEART RATE: 96 BPM | SYSTOLIC BLOOD PRESSURE: 103 MMHG | HEIGHT: 64 IN | TEMPERATURE: 97.6 F | DIASTOLIC BLOOD PRESSURE: 70 MMHG | RESPIRATION RATE: 16 BRPM | WEIGHT: 169.7 LBS | BODY MASS INDEX: 28.97 KG/M2

## 2024-07-09 DIAGNOSIS — Z34.03 ENCOUNTER FOR SUPERVISION OF NORMAL FIRST PREGNANCY IN THIRD TRIMESTER: Primary | ICD-10-CM

## 2024-07-09 DIAGNOSIS — D64.9 ANEMIA, UNSPECIFIED TYPE: ICD-10-CM

## 2024-07-09 DIAGNOSIS — R20.0 NUMBNESS AND TINGLING IN RIGHT HAND: ICD-10-CM

## 2024-07-09 DIAGNOSIS — R20.2 NUMBNESS AND TINGLING IN RIGHT HAND: ICD-10-CM

## 2024-07-09 DIAGNOSIS — Z3A.35 35 WEEKS GESTATION OF PREGNANCY: ICD-10-CM

## 2024-07-09 LAB
BASOPHILS # BLD AUTO: 0.02 THOUSANDS/ÂΜL (ref 0–0.1)
BASOPHILS NFR BLD AUTO: 0 % (ref 0–1)
EOSINOPHIL # BLD AUTO: 0.02 THOUSAND/ÂΜL (ref 0–0.61)
EOSINOPHIL NFR BLD AUTO: 0 % (ref 0–6)
ERYTHROCYTE [DISTWIDTH] IN BLOOD BY AUTOMATED COUNT: 14.6 % (ref 11.6–15.1)
FERRITIN SERPL-MCNC: 5 NG/ML (ref 11–307)
HCT VFR BLD AUTO: 32.1 % (ref 34.8–46.1)
HGB BLD-MCNC: 10.8 G/DL (ref 11.5–15.4)
IMM GRANULOCYTES # BLD AUTO: 0.04 THOUSAND/UL (ref 0–0.2)
IMM GRANULOCYTES NFR BLD AUTO: 1 % (ref 0–2)
IRON SATN MFR SERPL: 8 % (ref 15–50)
IRON SERPL-MCNC: 62 UG/DL (ref 50–212)
LYMPHOCYTES # BLD AUTO: 1.26 THOUSANDS/ÂΜL (ref 0.6–4.47)
LYMPHOCYTES NFR BLD AUTO: 21 % (ref 14–44)
MCH RBC QN AUTO: 29.5 PG (ref 26.8–34.3)
MCHC RBC AUTO-ENTMCNC: 33.6 G/DL (ref 31.4–37.4)
MCV RBC AUTO: 88 FL (ref 82–98)
MONOCYTES # BLD AUTO: 0.41 THOUSAND/ÂΜL (ref 0.17–1.22)
MONOCYTES NFR BLD AUTO: 7 % (ref 4–12)
NEUTROPHILS # BLD AUTO: 4.26 THOUSANDS/ÂΜL (ref 1.85–7.62)
NEUTS SEG NFR BLD AUTO: 71 % (ref 43–75)
NRBC BLD AUTO-RTO: 0 /100 WBCS
PLATELET # BLD AUTO: 204 THOUSANDS/UL (ref 149–390)
PMV BLD AUTO: 8.9 FL (ref 8.9–12.7)
RBC # BLD AUTO: 3.66 MILLION/UL (ref 3.81–5.12)
SL AMB  POCT GLUCOSE, UA: NEGATIVE
SL AMB POCT URINE PROTEIN: NEGATIVE
TIBC SERPL-MCNC: 758 UG/DL (ref 250–450)
UIBC SERPL-MCNC: 696 UG/DL (ref 155–355)
WBC # BLD AUTO: 6.01 THOUSAND/UL (ref 4.31–10.16)

## 2024-07-09 PROCEDURE — 36415 COLL VENOUS BLD VENIPUNCTURE: CPT

## 2024-07-09 PROCEDURE — 82728 ASSAY OF FERRITIN: CPT

## 2024-07-09 PROCEDURE — 81003 URINALYSIS AUTO W/O SCOPE: CPT | Performed by: FAMILY MEDICINE

## 2024-07-09 PROCEDURE — 83540 ASSAY OF IRON: CPT

## 2024-07-09 PROCEDURE — 85025 COMPLETE CBC W/AUTO DIFF WBC: CPT

## 2024-07-09 PROCEDURE — 83550 IRON BINDING TEST: CPT

## 2024-07-09 PROCEDURE — PNV: Performed by: FAMILY MEDICINE
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
## 2024-07-09 NOTE — ASSESSMENT & PLAN NOTE
Patient has Hb of 11 as of 05/21/24.   She denies any SOB, chest pain but reports usual tiredness that could be related to her pregnancy.  Exam: No pallor noticed.  Order CBC and Iron panel.  Patient counseled on the importance of repeat CBC and iron panel, and that there may be a need to start her on iron supplementation based on her results.  Patient exhibited full understanding of what was discussed  F/u in 1 week for 36 week PNV

## 2024-07-09 NOTE — ASSESSMENT & PLAN NOTE
Patient reports having tingling and numbness in her right upper extremity that started 3 weeks ago  Patient reports no real trigger for this pain, and states that it comes and goes.  She denies any loosening of her .  Exam was normal for power and sensation in bilateral upper extremities.  Order elbow brace for bilateral upper arms  Patient was counseled on the temporary nature of these symptoms due to the possibility of accumulation of body fluid as a normal part of pregnancy.  She was advised to do regular exercises of her hands by gripping a ball.  If her symptoms worsen, we can try physical therapy  Patient exhibited full understanding

## 2024-07-15 PROBLEM — Z3A.36 36 WEEKS GESTATION OF PREGNANCY: Status: ACTIVE | Noted: 2024-01-29

## 2024-07-15 PROBLEM — Z34.90 PREGNANCY: Status: ACTIVE | Noted: 2024-01-29

## 2024-07-16 ENCOUNTER — ROUTINE PRENATAL (OUTPATIENT)
Age: 34
End: 2024-07-16

## 2024-07-16 VITALS
HEART RATE: 103 BPM | SYSTOLIC BLOOD PRESSURE: 105 MMHG | BODY MASS INDEX: 29.16 KG/M2 | HEIGHT: 64 IN | DIASTOLIC BLOOD PRESSURE: 75 MMHG | WEIGHT: 170.8 LBS | TEMPERATURE: 97.1 F | OXYGEN SATURATION: 99 %

## 2024-07-16 DIAGNOSIS — Z3A.36 36 WEEKS GESTATION OF PREGNANCY: Primary | ICD-10-CM

## 2024-07-16 DIAGNOSIS — Z34.93 ENCOUNTER FOR SUPERVISION OF LOW-RISK PREGNANCY IN THIRD TRIMESTER: ICD-10-CM

## 2024-07-16 DIAGNOSIS — D50.8 OTHER IRON DEFICIENCY ANEMIA: ICD-10-CM

## 2024-07-16 LAB — SL AMB  POCT GLUCOSE, UA: NORMAL

## 2024-07-16 PROCEDURE — PNV: Performed by: FAMILY MEDICINE

## 2024-07-16 PROCEDURE — 81001 URINALYSIS AUTO W/SCOPE: CPT | Performed by: FAMILY MEDICINE

## 2024-07-16 PROCEDURE — 87150 DNA/RNA AMPLIFIED PROBE: CPT

## 2024-07-16 RX ORDER — FERROUS SULFATE 324(65)MG
324 TABLET, DELAYED RELEASE (ENTERIC COATED) ORAL
Qty: 60 TABLET | Refills: 3 | Status: SHIPPED | OUTPATIENT
Start: 2024-07-16

## 2024-07-16 RX ORDER — ASCORBIC ACID 500 MG
500 TABLET ORAL DAILY
Qty: 30 TABLET | Refills: 3 | Status: SHIPPED | OUTPATIENT
Start: 2024-07-16

## 2024-07-16 NOTE — PROGRESS NOTES
OB/GYN  PN Visit  Inderjit Matos  [id number]  2024  10:05 AM  Dr. Oni Solano MD    S: 34 y.o.  36w0d here for PN visit. She has no contractions. She has no leakage of fluid and vaginal bleeding. She has good fetal movement. Patient complains of back pain especially when she lays down.     Her pregnancy is complicated by : Genetic screening positive for carrier of Spinal muscular atrophy( SMA), fetal risk is low - 4000. Counseling given by [facility].     O:  Vitals:    24 0922   BP: 105/75   Pulse: 103   Temp: (!) 97.1 °F (36.2 °C)   SpO2: 99%     Physical Exam  Constitutional:       General: She is not in acute distress.     Appearance: Normal appearance. She is not ill-appearing.   HENT:      Head: Normocephalic and atraumatic.   Cardiovascular:      Rate and Rhythm: Normal rate and regular rhythm.      Pulses: Normal pulses.      Heart sounds: Normal heart sounds. No murmur heard.  Pulmonary:      Effort: Pulmonary effort is normal. No respiratory distress.      Breath sounds: Normal breath sounds. No wheezing.   Abdominal:      General: Abdomen is flat.      Tenderness: There is no abdominal tenderness. There is no right CVA tenderness or left CVA tenderness.      Comments: GARVID   Musculoskeletal:      Right lower leg: No edema.      Left lower leg: No edema.   Skin:     General: Skin is warm.   Neurological:      Mental Status: She is alert.       Fundal height: 36 cm  FHT: 146 bpm     A/P:  1. 36w0d GESTATIO  - UA Sample collected - WNL   - GBS swab collected    - Patient advised to stop taking Aspirin    - Continue PNV  - Labor precautions reviewed  - Fetal kick counts reviewed  - Ultrasounds: 24 - Vertex presentation, normal growth and MIRNA  - COVID shot: Completed   - Tdap given at : 24  - Delivery: Consent : signed  - Contraception: Nexplanon vs tubal if she has a   - RTO in 1 week    2. Anemia   - : Hb - 10.8, iron panel suggests iron deficiency  anemia   - Start iron supplementation , recommended to take it with Vitamin C to help with absoprtion   - Check iron in 2 weeks, plan for Venofer if indicated      Problem List       36 weeks gestation of pregnancy    Left lower quadrant abdominal pain    Encounter for supervision of low-risk pregnancy in first trimester    Numbness and tingling in right hand    Anemia    Overview     Iron panel ordered. Will f/u with results. Will start supplementation based on the results.              Future Appointments   Date Time Provider Department Center   7/23/2024  8:00 AM Oni Solano MD SW COV [facility]   7/30/2024  8:00 AM Frida Jo DO SW COV FP [facility]       Oni Solano MD  7/16/2024  10:05 AM

## 2024-07-18 LAB — GP B STREP DNA SPEC QL NAA+PROBE: NEGATIVE

## 2024-07-23 ENCOUNTER — ROUTINE PRENATAL (OUTPATIENT)
Age: 34
End: 2024-07-23

## 2024-07-23 VITALS
SYSTOLIC BLOOD PRESSURE: 117 MMHG | TEMPERATURE: 97.3 F | OXYGEN SATURATION: 99 % | HEART RATE: 76 BPM | BODY MASS INDEX: 29.78 KG/M2 | DIASTOLIC BLOOD PRESSURE: 76 MMHG | WEIGHT: 173.5 LBS

## 2024-07-23 DIAGNOSIS — D50.8 OTHER IRON DEFICIENCY ANEMIA: ICD-10-CM

## 2024-07-23 DIAGNOSIS — Z3A.37 37 WEEKS GESTATION OF PREGNANCY: Primary | ICD-10-CM

## 2024-07-23 LAB
SL AMB  POCT GLUCOSE, UA: NORMAL
SL AMB POCT URINE PROTEIN: 15

## 2024-07-23 PROCEDURE — 81002 URINALYSIS NONAUTO W/O SCOPE: CPT | Performed by: FAMILY MEDICINE

## 2024-07-23 PROCEDURE — PNV: Performed by: FAMILY MEDICINE

## 2024-07-23 NOTE — PROGRESS NOTES
OB/GYN  PN Visit  Inderjit Matos  [id number]  2024  9:12 AM  Dr. Oni Solano MD    S: 34 y.o.  37w1d here for PN visit. She has no contractions. She has no leakage of fluid and vaginal bleeding. She has good fetal movement.     Her pregnancy is complicated by : Genetic screening positive for carrier of Spinal muscular atrophy( SMA), fetal risk is low - 4000. Counseling given by [facility] and genetic counselor.     O:  Vitals:    24 0805   BP: 117/76   Pulse: 76   Temp: (!) 97.3 °F (36.3 °C)   SpO2: 99%     Physical Exam  Constitutional:       General: She is not in acute distress.     Appearance: Normal appearance. She is not ill-appearing.   HENT:      Head: Normocephalic and atraumatic.   Cardiovascular:      Rate and Rhythm: Normal rate and regular rhythm.      Pulses: Normal pulses.      Heart sounds: Normal heart sounds. No murmur heard.  Pulmonary:      Effort: Pulmonary effort is normal. No respiratory distress.      Breath sounds: Normal breath sounds. No wheezing.   Abdominal:      Tenderness: There is no right CVA tenderness or left CVA tenderness.      Comments: GRAVID   Skin:     General: Skin is warm.   Neurological:      Mental Status: She is alert.       Fundal height: 37 cm  FHT: 137 bpm      A/P:  1. 37w1d GESTATION   - UA Sample collected - WNL   - GBS swab collected at 36w - negative  - Patient no longer taking Aspirin, stopped at 36 weeks   - Continue PNV  - Labor precautions reviewed  - Fetal kick counts reviewed  - Ultrasounds: 24 - Vertex presentation, normal growth and MIRNA  - COVID shot: Completed   - Tdap given at : 24  - Delivery: Consent : signed  - Contraception: Nexplanon vs tubal if she has a ; MA 31 signed.   - RTO in 1 week     2. Anemia   - : Hb - 10.8, iron panel suggests iron deficiency anemia   - Start iron supplementation , recommended to take it with Vitamin C to help with absoprtion   - Check iron in 1 weeks, plan for  Venofer if indicated       Problem List       37 weeks gestation of pregnancy    Left lower quadrant abdominal pain    Encounter for supervision of low-risk pregnancy in first trimester    Numbness and tingling in right hand    Anemia    Overview     Iron panel ordered. Will f/u with results. Will start supplementation based on the results.              Future Appointments   Date Time Provider Department Center   7/30/2024  8:00 AM Frida Jo DO SW COV FP [facility]       Oni Solano MD  7/23/2024  9:12 AM

## 2024-07-30 ENCOUNTER — ROUTINE PRENATAL (OUTPATIENT)
Age: 34
End: 2024-07-30

## 2024-07-30 VITALS
HEART RATE: 92 BPM | WEIGHT: 174 LBS | BODY MASS INDEX: 29.71 KG/M2 | RESPIRATION RATE: 18 BRPM | HEIGHT: 64 IN | OXYGEN SATURATION: 99 % | DIASTOLIC BLOOD PRESSURE: 78 MMHG | SYSTOLIC BLOOD PRESSURE: 116 MMHG | TEMPERATURE: 97.8 F

## 2024-07-30 DIAGNOSIS — R20.2 NUMBNESS AND TINGLING IN RIGHT HAND: ICD-10-CM

## 2024-07-30 DIAGNOSIS — R10.32 LEFT LOWER QUADRANT ABDOMINAL PAIN: ICD-10-CM

## 2024-07-30 DIAGNOSIS — O99.013 ANEMIA DURING PREGNANCY IN THIRD TRIMESTER: ICD-10-CM

## 2024-07-30 DIAGNOSIS — Z3A.38 38 WEEKS GESTATION OF PREGNANCY: Primary | ICD-10-CM

## 2024-07-30 DIAGNOSIS — R20.0 NUMBNESS AND TINGLING IN RIGHT HAND: ICD-10-CM

## 2024-07-30 PROBLEM — O99.019 ANEMIA IN PREGNANCY: Status: ACTIVE | Noted: 2024-07-09

## 2024-07-30 LAB
SL AMB  POCT GLUCOSE, UA: NORMAL
SL AMB POCT URINE PROTEIN: NORMAL

## 2024-07-30 PROCEDURE — 81002 URINALYSIS NONAUTO W/O SCOPE: CPT | Performed by: OBSTETRICS & GYNECOLOGY

## 2024-07-30 PROCEDURE — PNV: Performed by: OBSTETRICS & GYNECOLOGY

## 2024-07-30 NOTE — ASSESSMENT & PLAN NOTE
Hgb 10.8, platelet 204  Iron saturation 8%, TIBC 758, iron 62, UIBC 696, ferritin 5    Plan:  -Continue PO iron supplement

## 2024-07-30 NOTE — PROGRESS NOTES
OB/GYN  PN Visit  Inderjit Matos  [id number]  2024  11:09 PM  Dr. Frida Jo, DO    S: 34 y.o.  38w1d here for PN visit. She endorses contractions since last night at 4 PM. Reporting that she feels the contractions when the baby moves, stating occurring every 40 minutes. She reports she thinks she passed her mucus plug -  noting large amounts of discharge. Denies vaginal bleeding. She endorses good fetal movement. Her pregnancy is complicatedGenetic screening positive for carrier of Spinal muscular atrophy( SMA), fetal risk is low - 4000. Counseling given by M and genetic counselor.     Notes the following non-OB:  -Reporting she is taking her iron supplements and is tolerating them well.   -Patient is reporting some nausea and headaches - noting she has not taken any medication. Has been trying to increase fluid intake. Denies fevers/chills/diarrhea  -Stating she has had similar LLQ pain previously in pregnancy, noting some constipation    O:  Vitals:    24 0816   BP: 116/78   Pulse: 92   Resp: 18   Temp: 97.8 °F (36.6 °C)   SpO2: 99%     Physical Exam  Constitutional:       General: She is not in acute distress.     Appearance: Normal appearance. She is not ill-appearing.   HENT:      Head: Normocephalic and atraumatic.   Cardiovascular:      Rate and Rhythm: Normal rate and regular rhythm.      Pulses: Normal pulses.      Heart sounds: Normal heart sounds. No murmur heard.  Pulmonary:      Effort: Pulmonary effort is normal. No respiratory distress.      Breath sounds: Normal breath sounds. No wheezing.   Abdominal:      Tenderness: There is abdominal tenderness (LLQ). There is no right CVA tenderness or left CVA tenderness.      Comments: GRAVID   Skin:     General: Skin is warm.   Neurological:      Mental Status: She is alert.       SVE: 0.5/0/-3    Fundal height: 146 BPM  FHT: 38 CM     A/P:  1. 38w1d GESTATION  - Continue PNV  - Labor precautions reviewed  - Fetal  kick counts reviewed  - Ultrasounds: 24 - Vertex presentation, normal growth and MIRNA   - Tdap at 28 weeks: given at 2024  - Delivery: consent signed   - Contraception: Nexplanon vs tubal if she has a ; MA 31 signed.   - RTO in 1 week    Problem List       38 weeks gestation of pregnancy    Overview     Overview:  Meds:  PNV  Labs  Pap smear WNL; HPV negative  Prenatal panel  Blood type O, Rh positive, Antibody negative  Hgb 12.4, Plt 245  CF negative  Rubella borderline  RPR NR, HepB NR, HepC NR, HIV NR  GC/CT negative  Urine Cx: negative  28w labs  1hr GTT  WNL  Hgb 11, Plt 199  Vaccines:  Tdap vaccine: given  Genetic screening positive for SMA  Ultrasounds:   (2024): IUP, CRL 12w1d, BRISEYDA 2024  BPM  (2024) 23w1d, breech presentation, normal FG, anterior placenta, EFW 79%,  BPM  (2024) 32w1d, vertex presentation, FG normal,  BPM, normal MIRNA, EFW 84%  LMP: 2023  BRISEYDA: 2024 based on US  Post-partum Contraception: Nexplanon vs tubal ligation if  (MA-31 signed)  Feeding plan: breast  Delivery consent signed  Next Appt in 1 week    Assessment and Plan:  Noting contractions every 40 minutes  Discussed  labor precautions and fetal kick counts  Return to clinic in 1 weeks  Plan for IOL at 39w           Left lower quadrant abdominal pain    Current Assessment & Plan     Chronic, suspect gastroenteritis vs round ligament pain vs constipation 2/2 iron tablets.    Plan:  -start miralax for constipation  -continue increase in fluid intake         Encounter for supervision of low-risk pregnancy in first trimester    Numbness and tingling in right hand    Anemia in pregnancy    Overview     Iron panel ordered. Will f/u with results. Will start supplementation based on the results.         Current Assessment & Plan     Hgb 10.8, platelet 204  Iron saturation 8%, TIBC 758, iron 62, UIBC 696, ferritin 5    Plan:  -Continue PO iron supplement                Future Appointments   Date Time Provider Department Center   8/6/2024  8:20 AM Liban Milton MD SW COV FP [facility]         Frida Jo DO  7/30/2024  11:09 PM

## 2024-07-31 NOTE — ASSESSMENT & PLAN NOTE
Chronic, suspect gastroenteritis vs round ligament pain vs constipation 2/2 iron tablets.    Plan:  -start miralax for constipation  -continue increase in fluid intake

## 2024-08-04 ENCOUNTER — HOSPITAL ENCOUNTER (OUTPATIENT)
Facility: HOSPITAL | Age: 34
Discharge: HOME | End: 2024-08-04
Attending: OBSTETRICS & GYNECOLOGY | Admitting: OBSTETRICS & GYNECOLOGY

## 2024-08-04 VITALS — SYSTOLIC BLOOD PRESSURE: 125 MMHG | DIASTOLIC BLOOD PRESSURE: 83 MMHG | OXYGEN SATURATION: 99 % | HEART RATE: 96 BPM

## 2024-08-04 PROBLEM — O47.9 UTERINE CONTRACTIONS: Status: ACTIVE | Noted: 2024-08-04

## 2024-08-04 PROCEDURE — 59025 FETAL NON-STRESS TEST: CPT

## 2024-08-04 PROCEDURE — NC001 PR NO CHARGE: Performed by: OBSTETRICS & GYNECOLOGY

## 2024-08-04 PROCEDURE — 99214 OFFICE O/P EST MOD 30 MIN: CPT

## 2024-08-04 PROCEDURE — 99213 OFFICE O/P EST LOW 20 MIN: CPT | Performed by: OBSTETRICS & GYNECOLOGY

## 2024-08-04 NOTE — PROCEDURES
Inderjit Matos, a  at 38w6d with an BRISEYDA of 2024, by Ultrasound, [facility] for the following procedure(s): $Procedure Type: NST]    Nonstress Test  Reason for NST: Routine  Variability: Moderate  Decelerations: None  Accelerations: Yes  Acoustic Stimulator: No  Baseline: 130 BPM  Uterine Irritability: No  Contractions: Regular  Contraction Frequency (minutes): 3 min                   Interpretation  Nonstress Test Interpretation: Reactive  Overall Impression: Reassuring        Denver Guzman MD  Obstetrics & Gynecology PGY-1  2024  7:09 AM

## 2024-08-04 NOTE — PROGRESS NOTES
2 hr recheck was unchanged   SVE 2/60/-2 with contractions every 2-3.5 minutes on tocometer.   Pt due to be induced on 8/7/-2. She was given labor precautions and discharged   [place] [id number]  D/w Dr Murillo

## 2024-08-04 NOTE — PROGRESS NOTES
L&D Triage Note - OB/GYN  Inderjit Matos 34 y.o. female MRN: [id number]  Unit/Bed#: LD TRIAGE  Encounter: [id number]      ASSESSMENT:    Inderjit Matos is a 34 y.o.  at 38w6d who was evaluated today in triage for contractions. SVE 2/60/-2 with contractions every 3 minutes on tocometer. Disposition will be determined by the day team.     PLAN:    1) Contractions  - SVE: 2/60/-2  - [place]: contractions every 3 minutes  - Recheck in 2 hours  - Disposition to be determined by the day team     SUBJECTIVE:    Inderjit Matos 34 y.o.  at 38w6d with an Estimated Date of Delivery: 24 presenting with abdominal pain. She reports having contractions since 7:40PM. She otherwise denies leakage of fluids, vaginal bleeding, and decreased fetal movement.       This pregnancy has been uncomplicated.         OBJECTIVE:    Vitals:    24 0423   BP: 125/83   Pulse: 96   SpO2: 99%       ROS:  Constitutional: Negative  Respiratory: Negative  Cardiovascular: Negative    Gastrointestinal: Negative    General Physical Exam:  General: Well appearing, no distress  Respiratory: not in acute distress  Cardiovascular: not in acute distress  Abdomen: Soft, gravid, nontender  Fundal Height: Appropriate for gestational age.  Extremities: Warm and well perfused.  Non tender.      Fetal monitoring:  Fetal heart rate: Baseline Rate (FHR): 135 bpm  Variability: Moderate  Accelerations: 15 x 15 or greater  Decelerations: None  FHR Category: Category I  [place]: Contraction Frequency (minutes): 3  Contraction Duration (seconds):   Contraction Intensity: Moderate      Cervical Exam  SVE: 260/-2        Denver Guzman MD  Obstetrics & Gynecology PGY-1  2024  7:05 AM

## 2024-08-05 ENCOUNTER — HOSPITAL ENCOUNTER (INPATIENT)
Facility: HOSPITAL | Age: 34
LOS: 2 days | Discharge: HOME | DRG: 560 | End: 2024-08-07
Attending: STUDENT IN AN ORGANIZED HEALTH CARE EDUCATION/TRAINING PROGRAM | Admitting: STUDENT IN AN ORGANIZED HEALTH CARE EDUCATION/TRAINING PROGRAM
Payer: COMMERCIAL

## 2024-08-05 ENCOUNTER — TELEPHONE (OUTPATIENT)
Age: 34
End: 2024-08-05

## 2024-08-05 ENCOUNTER — ANESTHESIA (INPATIENT)
Dept: ANESTHESIOLOGY | Facility: HOSPITAL | Age: 34
DRG: 560 | End: 2024-08-05
Payer: COMMERCIAL

## 2024-08-05 ENCOUNTER — ANESTHESIA EVENT (INPATIENT)
Dept: ANESTHESIOLOGY | Facility: HOSPITAL | Age: 34
DRG: 560 | End: 2024-08-05
Payer: COMMERCIAL

## 2024-08-05 DIAGNOSIS — Z3A.38 38 WEEKS GESTATION OF PREGNANCY: ICD-10-CM

## 2024-08-05 PROBLEM — Z3A.39 39 WEEKS GESTATION OF PREGNANCY: Status: ACTIVE | Noted: 2024-01-29

## 2024-08-05 LAB
ABO GROUP BLD: NORMAL
ALBUMIN SERPL BCG-MCNC: 3.9 G/DL (ref 3.5–5)
ALP SERPL-CCNC: 208 U/L (ref 34–104)
ALT SERPL W P-5'-P-CCNC: 10 U/L (ref 7–52)
ANION GAP SERPL CALCULATED.3IONS-SCNC: 9 MMOL/L (ref 4–13)
AST SERPL W P-5'-P-CCNC: 15 U/L (ref 13–39)
BILIRUB SERPL-MCNC: 0.47 MG/DL (ref 0.2–1)
BLD GP AB SCN SERPL QL: NEGATIVE
BUN SERPL-MCNC: 7 MG/DL (ref 5–25)
CALCIUM SERPL-MCNC: 9.2 MG/DL (ref 8.4–10.2)
CHLORIDE SERPL-SCNC: 104 MMOL/L (ref 96–108)
CO2 SERPL-SCNC: 21 MMOL/L (ref 21–32)
CREAT SERPL-MCNC: 0.56 MG/DL (ref 0.6–1.3)
ERYTHROCYTE [DISTWIDTH] IN BLOOD BY AUTOMATED COUNT: 15.4 % (ref 11.6–15.1)
GFR SERPL CREATININE-BSD FRML MDRD: 122 ML/MIN/1.73SQ M
GLUCOSE SERPL-MCNC: 86 MG/DL (ref 65–140)
HCT VFR BLD AUTO: 37.1 % (ref 34.8–46.1)
HGB BLD-MCNC: 12.7 G/DL (ref 11.5–15.4)
MCH RBC QN AUTO: 29.9 PG (ref 26.8–34.3)
MCHC RBC AUTO-ENTMCNC: 34.2 G/DL (ref 31.4–37.4)
MCV RBC AUTO: 87 FL (ref 82–98)
PLATELET # BLD AUTO: 208 THOUSANDS/UL (ref 149–390)
PMV BLD AUTO: 9.3 FL (ref 8.9–12.7)
POTASSIUM SERPL-SCNC: 4 MMOL/L (ref 3.5–5.3)
PROT SERPL-MCNC: 6.8 G/DL (ref 6.4–8.4)
RBC # BLD AUTO: 4.25 MILLION/UL (ref 3.81–5.12)
RH BLD: POSITIVE
SODIUM SERPL-SCNC: 134 MMOL/L (ref 135–147)
SPECIMEN EXPIRATION DATE: NORMAL
WBC # BLD AUTO: 10.85 THOUSAND/UL (ref 4.31–10.16)

## 2024-08-05 PROCEDURE — 86900 BLOOD TYPING SEROLOGIC ABO: CPT

## 2024-08-05 PROCEDURE — 86780 TREPONEMA PALLIDUM: CPT

## 2024-08-05 PROCEDURE — 10907ZC DRAINAGE OF AMNIOTIC FLUID, THERAPEUTIC FROM PRODUCTS OF CONCEPTION, VIA NATURAL OR ARTIFICIAL OPENING: ICD-10-PCS | Performed by: OBSTETRICS & GYNECOLOGY

## 2024-08-05 PROCEDURE — 86850 RBC ANTIBODY SCREEN: CPT

## 2024-08-05 PROCEDURE — NC001 PR NO CHARGE: Performed by: STUDENT IN AN ORGANIZED HEALTH CARE EDUCATION/TRAINING PROGRAM

## 2024-08-05 PROCEDURE — 86901 BLOOD TYPING SEROLOGIC RH(D): CPT

## 2024-08-05 PROCEDURE — 93005 ELECTROCARDIOGRAM TRACING: CPT

## 2024-08-05 PROCEDURE — 99214 OFFICE O/P EST MOD 30 MIN: CPT

## 2024-08-05 PROCEDURE — 85027 COMPLETE CBC AUTOMATED: CPT

## 2024-08-05 PROCEDURE — 80053 COMPREHEN METABOLIC PANEL: CPT

## 2024-08-05 RX ORDER — SODIUM CHLORIDE, SODIUM LACTATE, POTASSIUM CHLORIDE, CALCIUM CHLORIDE 600; 310; 30; 20 MG/100ML; MG/100ML; MG/100ML; MG/100ML
125 INJECTION, SOLUTION INTRAVENOUS CONTINUOUS
Status: DISCONTINUED | OUTPATIENT
Start: 2024-08-05 | End: 2024-08-06

## 2024-08-05 RX ORDER — ROPIVACAINE HYDROCHLORIDE 2 MG/ML
INJECTION, SOLUTION EPIDURAL; INFILTRATION; PERINEURAL CONTINUOUS PRN
Status: DISCONTINUED | OUTPATIENT
Start: 2024-08-05 | End: 2024-08-09 | Stop reason: HOSPADM

## 2024-08-05 RX ORDER — BUPIVACAINE HYDROCHLORIDE 2.5 MG/ML
30 INJECTION, SOLUTION EPIDURAL; INFILTRATION; INTRACAUDAL ONCE AS NEEDED
Status: DISCONTINUED | OUTPATIENT
Start: 2024-08-05 | End: 2024-08-06

## 2024-08-05 RX ORDER — LIDOCAINE HYDROCHLORIDE AND EPINEPHRINE 15; 5 MG/ML; UG/ML
INJECTION, SOLUTION EPIDURAL AS NEEDED
Status: DISCONTINUED | OUTPATIENT
Start: 2024-08-05 | End: 2024-08-09 | Stop reason: HOSPADM

## 2024-08-05 RX ORDER — ONDANSETRON 2 MG/ML
4 INJECTION INTRAMUSCULAR; INTRAVENOUS EVERY 6 HOURS PRN
Status: DISCONTINUED | OUTPATIENT
Start: 2024-08-05 | End: 2024-08-06

## 2024-08-05 RX ADMIN — LIDOCAINE HYDROCHLORIDE AND EPINEPHRINE 5 ML: 15; 5 INJECTION, SOLUTION EPIDURAL at 16:51

## 2024-08-05 RX ADMIN — SODIUM CHLORIDE, SODIUM LACTATE, POTASSIUM CHLORIDE, AND CALCIUM CHLORIDE 999 ML/HR: .6; .31; .03; .02 INJECTION, SOLUTION INTRAVENOUS at 16:00

## 2024-08-05 RX ADMIN — ROPIVACAINE HYDROCHLORIDE 10 ML/HR: 2 INJECTION EPIDURAL; INFILTRATION; PERINEURAL at 16:51

## 2024-08-05 RX ADMIN — SODIUM CHLORIDE, SODIUM LACTATE, POTASSIUM CHLORIDE, AND CALCIUM CHLORIDE 125 ML/HR: .6; .31; .03; .02 INJECTION, SOLUTION INTRAVENOUS at 16:20

## 2024-08-05 RX ADMIN — ROPIVACAINE HYDROCHLORIDE: 2 INJECTION, SOLUTION EPIDURAL; INFILTRATION at 17:30

## 2024-08-05 NOTE — H&P
H & P- Obstetrics   Inderjit Matos 34 y.o. female MRN: [id number]  Unit/Bed#: LD  Encounter: [id number]    Assessment: 34 y.o.  at 39w0d admitted for labor .  SVE: /-2  U/S EFW: 84% ; Cephalic confirmed by Dr. Gaming  GBS status: Negative   Postpartum contraception plan: Nexplanon vs tubal if she has a , MA 31 signed.     Dr. Gaming aware    * 39 weeks gestation of pregnancy  Assessment & Plan  Admit  CBC, RPR, Type & Screen  Analgesia at maternal request  Expectant management     Labs  Pap smear WNL; HPV negative  Prenatal panel  Blood type O, Rh positive, Antibody negative  Hgb 12.4, Plt 245  CF negative  Rubella borderline  RPR NR, HepB NR, HepC NR, HIV NR  GC/CT negative  Urine Cx: negative  28w labs  1hr GTT  WNL  Hgb 11, Plt 199  Vaccines:  Tdap vaccine: given  Genetic screening positive for SMA  Ultrasounds:   (2024): IUP, CRL 12w1d, BRISEYDA 2024  BPM  (2024) 23w1d, breech presentation, normal FG, anterior placenta, EFW 79%,  BPM  (2024) 32w1d, vertex presentation, FG normal,  BPM, normal MIRNA, EFW 84%  LMP: 2023  Post-partum Contraception: Nexplanon vs tubal ligation if  (MA-31 signed)  Feeding plan: breast    Anemia in pregnancy  Assessment & Plan  Treated with Iron supplements during pregnancy     Admission Hb: 12.7    Type and screen      Discussed case and plan w/ Dr. Gaming      Chief Complaint: contractions and vaginal bleeding    HPI: Inderjit Matos is a 34 y.o.  with an BRISEYDA of 2024, by Ultrasound at 39w0d who is being admitted for labor . She complains of uterine contractions, occurring every 2 minutes, has no LOF, and reports moderate VB. She states she has felt good FM.    Patient Active Problem List   Diagnosis    39 weeks gestation of pregnancy    Left lower quadrant abdominal pain    Encounter for supervision of low-risk pregnancy in first trimester    Numbness and tingling in right  hand    Anemia in pregnancy    Uterine contractions       Baby complications/comments: none    Review of Systems   Constitutional:  Negative for fatigue and fever.   Gastrointestinal:  Negative for abdominal pain, diarrhea, nausea, rectal pain and vomiting.   Genitourinary:  Positive for vaginal bleeding. Negative for decreased urine volume, difficulty urinating, dyspareunia, dysuria, vaginal discharge and vaginal pain.       OB Hx:  OB History    Para Term  AB Living   1 0 0 0 0     SAB IAB Ectopic Multiple Live Births   0 0 0          # Outcome Date GA Lbr Aki/2nd Weight Sex Type Anes PTL Lv   1 Current                Past Medical Hx:  History reviewed. No pertinent past medical history.    Past Surgical hx:  History reviewed. No pertinent surgical history.      Social History     Substance and Sexual Activity   Alcohol Use Yes    Comment: occasional       Social History     Tobacco Use   Smoking Status Never    Passive exposure: Never   Smokeless Tobacco Never         No Known Allergies      Medications Prior to Admission:     ascorbic acid (VITAMIN C) 500 MG tablet    ferrous sulfate 324 (65 Fe) mg    Prenatal Vit-Fe Fumarate-FA (Prenatal Vitamin) 27-0.8 MG TABS    Objective:     There is no height or weight on file to calculate BMI.     Physical Exam:  Physical Exam  Cardiovascular:      Rate and Rhythm: Normal rate and regular rhythm.      Pulses: Normal pulses.      Heart sounds: Normal heart sounds. No murmur heard.  Pulmonary:      Effort: Pulmonary effort is normal. No respiratory distress.      Breath sounds: Normal breath sounds. No wheezing or rhonchi.   Chest:      Chest wall: No tenderness.   Abdominal:      Comments: GRAVID   Neurological:      Mental Status: She is alert.        FHT: Category I, reactive      TOCO: Contractions every 2 minutes        Lab Results   Component Value Date    WBC 10.85 (H) 2024    HGB 12.7 2024    HCT 37.1 2024     2024      Lab Results   Component Value Date    K 4.0 08/05/2024     08/05/2024    CO2 21 08/05/2024    BUN 7 08/05/2024    CREATININE 0.56 (L) 08/05/2024    GLUCOSE 116 05/17/2024    AST 15 08/05/2024    ALT 10 08/05/2024     Prenatal Labs: Reviewed      Blood type:O  Antibody: negative  GBS: negative  HIV: Negative  Rubella: NR  Syphilis IgM/IgG: NR  HBsAg: NR  HCAb: NR  Chlamydia: negative  Gonorrhea: negative  Diabetes 1 hour screen: WNL  Platelets: 208  Hgb: 12.7  >2 Midnights  INPATIENT     Signature/Title: Oni Solano MD  Date: 8/5/2024  Time: 4:17 PM

## 2024-08-05 NOTE — ANESTHESIA PROCEDURE NOTES
Epidural Block    Start time: 8/5/2024 4:51 PM  Reason for block: procedure for pain  Staffing  Performed by: Fabien Leiva MD  Authorized by: Fabien Leiva MD    Preanesthetic Checklist  Completed: patient identified, IV checked, site marked, risks and benefits discussed, surgical consent, monitors and equipment checked, pre-op evaluation and timeout performed  Epidural  Patient position: sitting  Prep: ChloraPrep  Sedation Level: no sedation  Patient monitoring: frequent blood pressure checks, continuous pulse oximetry and heart rate  Approach: midline  Location: lumbar, L4-5  Injection technique: TOSHIA saline  Needle  Needle type: Tuohy   Needle gauge: 18 G  Needle insertion depth: 5 cm  Catheter type: multi-orifice  Catheter size: 20 G  Catheter at skin depth: 10 cm  Catheter securement method: stabilization device and clear occlusive dressing  Test dose: negative  Assessment  Sensory level: T10  Number of attempts: 1negative aspiration for CSF, negative aspiration for heme and no paresthesia on injection  patient tolerated the procedure well with no immediate complications  Additional Notes  Uncomplicated epidural placement

## 2024-08-05 NOTE — OB LABOR/OXYTOCIN SAFETY PROGRESS
Labor Progress Note - Dairy Krystal Matos 34 y.o. female MRN: [id number]    Unit/Bed#: -01 Encounter: [id number]       Contraction Frequency (minutes): 2-3  Contraction Intensity: Moderate  Uterine Activity Characteristics: Regular  Cervical Dilation: 5        Cervical Effacement: 70  Fetal Station: -2  Baseline Rate (FHR): 145 bpm  Fetal Heart Rate (FHT): 149 BPM  FHR Category: Category I               Vital Signs:  Vitals:    08/05/24 1847   BP: 115/74   Pulse: 89   Resp:    Temp:        Notes/comments:   Patient resting comfortably in bed.   Category I tracing, baseline of 145, moderate variability, accelerations noted, no decelerations, contractions q2-3 minutes.   Plan for pitocin if unchanged at next check.     Dr. Kilgore aware.     Janeth Corcoran MD 8/5/2024 7:20 PM

## 2024-08-05 NOTE — ASSESSMENT & PLAN NOTE
Treated with Iron supplements during pregnancy     Admission Hb: 12.7. Blood loss 426 cc.     Stable. F/u outpatient with PCP

## 2024-08-05 NOTE — ASSESSMENT & PLAN NOTE
Labs  Pap smear WNL; HPV negative  Prenatal panel  Blood type O, Rh positive, Antibody negative  Hgb 12.4, Plt 245  CF negative  Rubella borderline  RPR NR, HepB NR, HepC NR, HIV NR  GC/CT negative  Urine Cx: negative  28w labs  1hr GTT  WNL  Hgb 11, Plt 199  Vaccines:  Tdap vaccine: given  Genetic screening positive for SMA  Ultrasounds:   (2024): IUP, CRL 12w1d, BRISEYDA 2024  BPM  (2024) 23w1d, breech presentation, normal FG, anterior placenta, EFW 79%,  BPM  (2024) 32w1d, vertex presentation, FG normal,  BPM, normal MIRNA, EFW 84%  LMP: 2023  Post-partum Contraception: Nexplanon vs tubal ligation if  (MA-31 signed)  Feeding plan: breast  Delivery consent signed

## 2024-08-05 NOTE — ASSESSMENT & PLAN NOTE
Admit  CBC, RPR, Type & Screen  Analgesia at maternal request  Expectant management     Labs  Pap smear WNL; HPV negative  Prenatal panel  Blood type O, Rh positive, Antibody negative  Hgb 12.4, Plt 245  CF negative  Rubella borderline  RPR NR, HepB NR, HepC NR, HIV NR  GC/CT negative  Urine Cx: negative  28w labs  1hr GTT  WNL  Hgb 11, Plt 199  Vaccines:  Tdap vaccine: given  Genetic screening positive for SMA  Ultrasounds:   (2024): IUP, CRL 12w1d, BRISEYDA 2024  BPM  (2024) 23w1d, breech presentation, normal FG, anterior placenta, EFW 79%,  BPM  (2024) 32w1d, vertex presentation, FG normal,  BPM, normal MIRNA, EFW 84%  LMP: 2023  Post-partum Contraception: Nexplanon vs tubal ligation if  (MA-31 signed)  Feeding plan: breast

## 2024-08-05 NOTE — TELEPHONE ENCOUNTER
Fax received from [employer].    Copy scanned into encounter. Placed in Dr. Tyree Guzman's folder.    Fax completed form: [phone]

## 2024-08-05 NOTE — ANESTHESIA PREPROCEDURE EVALUATION
Procedure:  LABOR ANALGESIA    Relevant Problems   GYN   (+) 39 weeks gestation of pregnancy      HEMATOLOGY   (+) Anemia in pregnancy      NEURO/PSYCH   (+) Numbness and tingling in right hand        Physical Exam    Airway    Mallampati score: II  TM Distance: >3 FB  Neck ROM: full     Dental       Cardiovascular      Pulmonary      Other Findings  post-pubertal.      Anesthesia Plan  ASA Score- 2     Anesthesia Type- epidural with ASA Monitors.         Additional Monitors:     Airway Plan:            Plan Factors-Exercise tolerance (METS): >4 METS.    Chart reviewed.                      Induction-     Postoperative Plan-     Perioperative Resuscitation Plan - Level 1 - Full Code.       Informed Consent- Anesthetic plan and risks discussed with patient.  I personally reviewed this patient with the CRNA. Discussed and agreed on the Anesthesia Plan with the CRNA..

## 2024-08-05 NOTE — PLAN OF CARE
Problem: Knowledge Deficit  Goal: Verbalizes understanding of labor plan  Description: Assess patient/family/caregiver's baseline knowledge level and ability to understand information.  Provide education via patient/family/caregiver's preferred learning method at appropriate level of understanding.     1. Provide teaching at level of understanding.  2. Provide teaching via preferred learning method(s).  Outcome: Progressing  Goal: Patient/family/caregiver demonstrates understanding of disease process, treatment plan, medications, and discharge instructions  Description: Complete learning assessment and assess knowledge base.  Interventions:  - Provide teaching at level of understanding  - Provide teaching via preferred learning methods  Outcome: Progressing     Problem: Labor & Delivery  Goal: Manages discomfort  Description: Assess and monitor for signs and symptoms of discomfort.  Assess patient's pain level regularly and per hospital policy.  Administer medications as ordered. Support use of nonpharmacological methods to help control pain such as distraction, imagery, relaxation, and application of heat and cold.  Collaborate with interdisciplinary team and patient to determine appropriate pain management plan.    1. Include patient in decisions related to comfort.  2. Offer non-pharmacological pain management interventions.  3. Report ineffective pain management to physician.  Outcome: Progressing  Goal: Patient vital signs are stable  Description: 1. Assess vital signs - vaginal delivery.  Outcome: Progressing     Problem: PAIN - ADULT  Goal: Verbalizes/displays adequate comfort level or baseline comfort level  Description: Interventions:  - Encourage patient to monitor pain and request assistance  - Assess pain using appropriate pain scale  - Administer analgesics based on type and severity of pain and evaluate response  - Implement non-pharmacological measures as appropriate and evaluate response  -  Consider cultural and social influences on pain and pain management  - Notify physician/advanced practitioner if interventions unsuccessful or patient reports new pain  Outcome: Progressing     Problem: INFECTION - ADULT  Goal: Absence or prevention of progression during hospitalization  Description: INTERVENTIONS:  - Assess and monitor for signs and symptoms of infection  - Monitor lab/diagnostic results  - Monitor all insertion sites, i.e. indwelling lines, tubes, and drains  - Monitor endotracheal if appropriate and nasal secretions for changes in amount and color  - [place] appropriate cooling/warming therapies per order  - Administer medications as ordered  - Instruct and encourage patient and family to use good hand hygiene technique  - Identify and instruct in appropriate isolation precautions for identified infection/condition  Outcome: Progressing  Goal: Absence of fever/infection during neutropenic period  Description: INTERVENTIONS:  - Monitor WBC    Outcome: Progressing     Problem: SAFETY ADULT  Goal: Patient will remain free of falls  Description: INTERVENTIONS:  - Educate patient/family on patient safety including physical limitations  - Instruct patient to call for assistance with activity   - Consult OT/PT to assist with strengthening/mobility   - Keep Call bell within reach  - Keep bed low and locked with side rails adjusted as appropriate  - Keep care items and personal belongings within reach  - Initiate and maintain comfort rounds  - Make Fall Risk Sign visible to staff  - Offer Toileting every  Hours, in advance of need  - Initiate/Maintain alarm  - Obtain necessary fall risk management equipment:   - Apply yellow socks and bracelet for high fall risk patients  - Consider moving patient to room near nurses station  Outcome: Progressing  Goal: Maintain or return to baseline ADL function  Description: INTERVENTIONS:  -  Assess patient's ability to carry out ADLs; assess patient's baseline for  ADL function and identify physical deficits which impact ability to perform ADLs (bathing, care of mouth/teeth, toileting, grooming, dressing, etc.)  - Assess/evaluate cause of self-care deficits   - Assess range of motion  - Assess patient's mobility; develop plan if impaired  - Assess patient's need for assistive devices and provide as appropriate  - Encourage maximum independence but intervene and supervise when necessary  - Involve family in performance of ADLs  - Assess for home care needs following discharge   - Consider OT consult to assist with ADL evaluation and planning for discharge  - Provide patient education as appropriate  Outcome: Progressing  Goal: Maintains/Returns to pre admission functional level  Description: INTERVENTIONS:  - Perform AM-PAC 6 Click Basic Mobility/ Daily Activity assessment daily.  - Set and communicate daily mobility goal to care team and patient/family/caregiver.   - Collaborate with rehabilitation services on mobility goals if consulted  - Perform Range of Motion  times a day.  - Reposition patient every  hours.  - Dangle patient  times a day  - Stand patient times a day  - Ambulate patient  times a day  - Out of bed to chair  times a day   - Out of bed for meals times a day  - Out of bed for toileting  - Record patient progress and toleration of activity level   Outcome: Progressing     Problem: DISCHARGE PLANNING  Goal: Discharge to home or other facility with appropriate resources  Description: INTERVENTIONS:  - Identify barriers to discharge w/patient and caregiver  - Arrange for needed discharge resources and transportation as appropriate  - Identify discharge learning needs (meds, wound care, etc.)  - Arrange for interpretive services to assist at discharge as needed  - Refer to Case Management Department for coordinating discharge planning if the patient needs post-hospital services based on physician/advanced practitioner order or complex needs related to functional  status, cognitive ability, or social support system  Outcome: Progressing

## 2024-08-06 PROBLEM — Z3A.39 39 WEEKS GESTATION OF PREGNANCY: Status: RESOLVED | Noted: 2024-01-29 | Resolved: 2024-08-06

## 2024-08-06 PROBLEM — O47.9 UTERINE CONTRACTIONS: Status: RESOLVED | Noted: 2024-08-04 | Resolved: 2024-08-06

## 2024-08-06 PROBLEM — R00.0 TACHYCARDIA: Status: ACTIVE | Noted: 2024-08-06

## 2024-08-06 LAB
ATRIAL RATE: 124 BPM
ATRIAL RATE: 129 BPM
BASE EXCESS BLDCOA CALC-SCNC: -5.5 MMOL/L (ref 3–11)
BASE EXCESS BLDCOV CALC-SCNC: -4.2 MMOL/L (ref 1–9)
HCO3 BLDCOA-SCNC: 24.2 MMOL/L (ref 17.3–27.3)
HCO3 BLDCOV-SCNC: 22.1 MMOL/L (ref 12.2–28.6)
O2 CT VFR BLDCOA CALC: 7.3 ML/DL
OXYHGB MFR BLDCOA: 31.4 %
OXYHGB MFR BLDCOV: 71.9 %
P AXIS: 59 DEGREES
P AXIS: 64 DEGREES
PCO2 BLDCOA: 64.1 MM[HG] (ref 30–60)
PCO2 BLDCOV: 44.7 MM HG (ref 27–43)
PH BLDCOA: 7.19 [PH] (ref 7.23–7.43)
PH BLDCOV: 7.31 [PH] (ref 7.19–7.49)
PO2 BLDCOA: 18.7 MM HG (ref 5–25)
PO2 BLDCOV: 33 MM HG (ref 15–45)
PR INTERVAL: 116 MS
PR INTERVAL: 138 MS
QRS AXIS: 42 DEGREES
QRS AXIS: 46 DEGREES
QRSD INTERVAL: 88 MS
QRSD INTERVAL: 88 MS
QT INTERVAL: 326 MS
QT INTERVAL: 328 MS
QTC INTERVAL: 471 MS
QTC INTERVAL: 477 MS
SAO2 % BLDCOV: 16.3 ML/DL
T WAVE AXIS: 43 DEGREES
T WAVE AXIS: 44 DEGREES
TREPONEMA PALLIDUM IGG+IGM AB [PRESENCE] IN SERUM OR PLASMA BY IMMUNOASSAY: NORMAL
TSH SERPL DL<=0.05 MIU/L-ACNC: 3.93 UIU/ML (ref 0.45–4.5)
VENTRICULAR RATE: 124 BPM
VENTRICULAR RATE: 129 BPM

## 2024-08-06 PROCEDURE — 93010 ELECTROCARDIOGRAM REPORT: CPT | Performed by: INTERNAL MEDICINE

## 2024-08-06 PROCEDURE — 82805 BLOOD GASES W/O2 SATURATION: CPT | Performed by: STUDENT IN AN ORGANIZED HEALTH CARE EDUCATION/TRAINING PROGRAM

## 2024-08-06 PROCEDURE — 0KQM0ZZ REPAIR PERINEUM MUSCLE, OPEN APPROACH: ICD-10-PCS | Performed by: OBSTETRICS & GYNECOLOGY

## 2024-08-06 PROCEDURE — 84443 ASSAY THYROID STIM HORMONE: CPT | Performed by: OBSTETRICS & GYNECOLOGY

## 2024-08-06 PROCEDURE — 59409 OBSTETRICAL CARE: CPT | Performed by: OBSTETRICS & GYNECOLOGY

## 2024-08-06 RX ORDER — IBUPROFEN 600 MG/1
600 TABLET ORAL EVERY 6 HOURS PRN
Status: DISCONTINUED | OUTPATIENT
Start: 2024-08-06 | End: 2024-08-07 | Stop reason: HOSPADM

## 2024-08-06 RX ORDER — DOCUSATE SODIUM 100 MG/1
100 CAPSULE, LIQUID FILLED ORAL 2 TIMES DAILY
Status: DISCONTINUED | OUTPATIENT
Start: 2024-08-06 | End: 2024-08-07 | Stop reason: HOSPADM

## 2024-08-06 RX ORDER — CALCIUM CARBONATE 500 MG/1
1000 TABLET, CHEWABLE ORAL 3 TIMES DAILY PRN
Status: DISCONTINUED | OUTPATIENT
Start: 2024-08-06 | End: 2024-08-07 | Stop reason: HOSPADM

## 2024-08-06 RX ORDER — OXYTOCIN/RINGER'S LACTATE 30/500 ML
PLASTIC BAG, INJECTION (ML) INTRAVENOUS
Status: COMPLETED
Start: 2024-08-06 | End: 2024-08-06

## 2024-08-06 RX ORDER — ACETAMINOPHEN 325 MG/1
650 TABLET ORAL EVERY 4 HOURS PRN
Status: DISCONTINUED | OUTPATIENT
Start: 2024-08-06 | End: 2024-08-07 | Stop reason: HOSPADM

## 2024-08-06 RX ORDER — BENZOCAINE/MENTHOL 6 MG-10 MG
1 LOZENGE MUCOUS MEMBRANE DAILY PRN
Status: DISCONTINUED | OUTPATIENT
Start: 2024-08-06 | End: 2024-08-07 | Stop reason: HOSPADM

## 2024-08-06 RX ORDER — OXYTOCIN/RINGER'S LACTATE 30/500 ML
250 PLASTIC BAG, INJECTION (ML) INTRAVENOUS ONCE
Status: COMPLETED | OUTPATIENT
Start: 2024-08-06 | End: 2024-08-06

## 2024-08-06 RX ORDER — ONDANSETRON 2 MG/ML
4 INJECTION INTRAMUSCULAR; INTRAVENOUS EVERY 8 HOURS PRN
Status: DISCONTINUED | OUTPATIENT
Start: 2024-08-06 | End: 2024-08-07 | Stop reason: HOSPADM

## 2024-08-06 RX ORDER — SIMETHICONE 80 MG
80 TABLET,CHEWABLE ORAL 4 TIMES DAILY PRN
Status: DISCONTINUED | OUTPATIENT
Start: 2024-08-06 | End: 2024-08-07 | Stop reason: HOSPADM

## 2024-08-06 RX ADMIN — IBUPROFEN 600 MG: 600 TABLET ORAL at 18:12

## 2024-08-06 RX ADMIN — ACETAMINOPHEN 650 MG: 325 TABLET, FILM COATED ORAL at 14:16

## 2024-08-06 RX ADMIN — Medication 250 MILLI-UNITS/MIN: at 02:43

## 2024-08-06 RX ADMIN — SODIUM CHLORIDE 1000 ML: 0.9 INJECTION, SOLUTION INTRAVENOUS at 11:52

## 2024-08-06 RX ADMIN — DOCUSATE SODIUM 100 MG: 100 CAPSULE, LIQUID FILLED ORAL at 18:12

## 2024-08-06 RX ADMIN — DOCUSATE SODIUM 100 MG: 100 CAPSULE, LIQUID FILLED ORAL at 12:01

## 2024-08-06 RX ADMIN — ROPIVACAINE HYDROCHLORIDE: 2 INJECTION, SOLUTION EPIDURAL; INFILTRATION at 00:06

## 2024-08-06 RX ADMIN — WITCH HAZEL 1 PAD: 500 SOLUTION RECTAL; TOPICAL at 05:38

## 2024-08-06 RX ADMIN — ACETAMINOPHEN 650 MG: 325 TABLET, FILM COATED ORAL at 06:55

## 2024-08-06 RX ADMIN — OXYTOCIN 250 MILLI-UNITS/MIN: 10 INJECTION INTRAVENOUS at 02:43

## 2024-08-06 RX ADMIN — BENZOCAINE AND LEVOMENTHOL 1 APPLICATION: 200; 5 SPRAY TOPICAL at 05:38

## 2024-08-06 NOTE — PLAN OF CARE
Problem: Knowledge Deficit  Goal: Verbalizes understanding of labor plan  Description: Assess patient/family/caregiver's baseline knowledge level and ability to understand information.  Provide education via patient/family/caregiver's preferred learning method at appropriate level of understanding.     1. Provide teaching at level of understanding.  2. Provide teaching via preferred learning method(s).  8/6/2024 0647 by Jenni Cunningham RN  Outcome: Progressing  8/6/2024 0647 by Jenni Cunningham RN  Outcome: Progressing  Goal: Patient/family/caregiver demonstrates understanding of disease process, treatment plan, medications, and discharge instructions  Description: Complete learning assessment and assess knowledge base.  Interventions:  - Provide teaching at level of understanding  - Provide teaching via preferred learning methods  8/6/2024 0647 by Jenni Cunningham RN  Outcome: Progressing  8/6/2024 0647 by Jenni Cunningham RN  Outcome: Progressing     Problem: Labor & Delivery  Goal: Manages discomfort  Description: Assess and monitor for signs and symptoms of discomfort.  Assess patient's pain level regularly and per hospital policy.  Administer medications as ordered. Support use of nonpharmacological methods to help control pain such as distraction, imagery, relaxation, and application of heat and cold.  Collaborate with interdisciplinary team and patient to determine appropriate pain management plan.    1. Include patient in decisions related to comfort.  2. Offer non-pharmacological pain management interventions.  3. Report ineffective pain management to physician.  8/6/2024 0647 by Jenni Cunningham RN  Outcome: Progressing  8/6/2024 0647 by Jenni Cunningham RN  Outcome: Progressing  Goal: Patient vital signs are stable  Description: 1. Assess vital signs - vaginal delivery.  8/6/2024 0647 by Jenni Cunningham RN  Outcome: Progressing  8/6/2024 0647 by Jenni Cunningham RN  Outcome: Progressing     Problem: PAIN - ADULT  Goal:  Verbalizes/displays adequate comfort level or baseline comfort level  Description: Interventions:  - Encourage patient to monitor pain and request assistance  - Assess pain using appropriate pain scale  - Administer analgesics based on type and severity of pain and evaluate response  - Implement non-pharmacological measures as appropriate and evaluate response  - Consider cultural and social influences on pain and pain management  - Notify physician/advanced practitioner if interventions unsuccessful or patient reports new pain  8/6/2024 0647 by Jenni Cunningham RN  Outcome: Progressing  8/6/2024 0647 by Jenni Cunningham RN  Outcome: Progressing     Problem: INFECTION - ADULT  Goal: Absence or prevention of progression during hospitalization  Description: INTERVENTIONS:  - Assess and monitor for signs and symptoms of infection  - Monitor lab/diagnostic results  - Monitor all insertion sites, i.e. indwelling lines, tubes, and drains  - Monitor endotracheal if appropriate and nasal secretions for changes in amount and color  - [place] appropriate cooling/warming therapies per order  - Administer medications as ordered  - Instruct and encourage patient and family to use good hand hygiene technique  - Identify and instruct in appropriate isolation precautions for identified infection/condition  Outcome: Progressing  Goal: Absence of fever/infection during neutropenic period  Description: INTERVENTIONS:  - Monitor WBC    Outcome: Progressing     Problem: DISCHARGE PLANNING  Goal: Discharge to home or other facility with appropriate resources  Description: INTERVENTIONS:  - Identify barriers to discharge w/patient and caregiver  - Arrange for needed discharge resources and transportation as appropriate  - Identify discharge learning needs (meds, wound care, etc.)  - Arrange for interpretive services to assist at discharge as needed  - Refer to Case Management Department for coordinating discharge planning if the patient needs  post-hospital services based on physician/advanced practitioner order or complex needs related to functional status, cognitive ability, or social support system  Outcome: Progressing     Problem: SAFETY ADULT  Goal: Patient will remain free of falls  Description: INTERVENTIONS:  - Educate patient/family on patient safety including physical limitations  - Instruct patient to call for assistance with activity   - Consult OT/PT to assist with strengthening/mobility   - Keep Call bell within reach  - Keep bed low and locked with side rails adjusted as appropriate  - Keep care items and personal belongings within reach  - Initiate and maintain comfort rounds  - Make Fall Risk Sign visible to staff  - Offer Toileting every  Hours, in advance of need  - Initiate/Maintain alarm  - Obtain necessary fall risk management equipment:   - Apply yellow socks and bracelet for high fall risk patients  - Consider moving patient to room near nurses station  Outcome: Progressing  Goal: Maintain or return to baseline ADL function  Description: INTERVENTIONS:  -  Assess patient's ability to carry out ADLs; assess patient's baseline for ADL function and identify physical deficits which impact ability to perform ADLs (bathing, care of mouth/teeth, toileting, grooming, dressing, etc.)  - Assess/evaluate cause of self-care deficits   - Assess range of motion  - Assess patient's mobility; develop plan if impaired  - Assess patient's need for assistive devices and provide as appropriate  - Encourage maximum independence but intervene and supervise when necessary  - Involve family in performance of ADLs  - Assess for home care needs following discharge   - Consider OT consult to assist with ADL evaluation and planning for discharge  - Provide patient education as appropriate  Outcome: Progressing  Goal: Maintains/Returns to pre admission functional level  Description: INTERVENTIONS:  - Perform AM-PAC 6 Click Basic Mobility/ Daily Activity  assessment daily.  - Set and communicate daily mobility goal to care team and patient/family/caregiver.   - Collaborate with rehabilitation services on mobility goals if consulted  - Perform Range of Motion  times a day.  - Reposition patient every hours.  - Dangle patient  times a day  - Stand patient  times a day  - Ambulate patient  times a day  - Out of bed to chair  times a day   - Out of bed for meals times a day  - Out of bed for toileting  - Record patient progress and toleration of activity level   Outcome: Progressing

## 2024-08-06 NOTE — L&D DELIVERY NOTE
Vaginal Delivery Summary - OB/GYN   Inderjit Matos 34 y.o. female MRN: [id number]  Unit/Bed#: -01 Encounter: [id number]          Predelivery Diagnosis:  1. Pregnancy at 39w  2. Labor     Postdelivery Diagnosis:  1. Same as above  2. Delivery of full term     Procedure: Spontaneous Vaginal Delivery, repair of 2nd degree laceration    Attending: Brunilda Kilgore MD    Assistant: Janeth Corcoran MD    Anesthesia: Epidural    QBL: pending  Admission H.7  Admission platelets: 208    Complications: none apparent    Specimens: cord blood, arterial and venous cord blood gasses, placenta to storage    Findings:   1. Viable female at 02:41, with APGARS of 8 and 9 at 1 and 5 minutes respectively,  2. Spontaneous delivery of intact placenta   3. 2 degree laceration repaired with 3-0 Vicryl rapide  4. Blood gases:    Umbilical Cord Venous Blood Gas:  Results from last 7 days   Lab Units 24  0306   PH COV  7.312   PCO2 COV mm HG 44.7*   HCO3 COV mmol/L 22.1   BASE EXC COV mmol/L -4.2*   O2 CT CD VB mL/dL 16.3   O2 HGB, VENOUS CORD % 71.9     Umbilical Cord Arterial Blood Gas:  Results from last 7 days   Lab Units 24  0306   PH COA  7.194*   PCO2 COA  64.1*   PO2 COA mm HG 18.7   HCO3 COA mmol/L 24.2   BASE EXC COA mmol/L -5.5*   O2 CONTENT CORD ART ml/dl 7.3   O2 HGB, ARTERIAL CORD % 31.4       Disposition:  Patient tolerated the procedure well and was recovering in labor and delivery room.     Brief history and labor course:  Ms. Inderjit Matos is a 34 y.o.  at 39wk1d. She presented to labor and delivery with contractions. During her initial check at 15:14, she was 5/70/-2. She remained unchanged and underwent aROM with clear fluid at 18:57. She progressed to full cervical dilation at 02:08 and began pushing at 02:25.       Description of procedure    After pushing for 16 minutes, at 02:41 patient delivered a viable female , wt pending, apgars of 8 (1 min)  and 9 (5 min). The fetal vertex delivered spontaneously. Baby was checked for nuchal. There was no nuchal cord. The anterior shoulder delivered atraumatically with maternal expulsive forces and the assistance of downward traction. The posterior shoulder delivered with maternal expulsive forces and the assistance of upward traction. The remainder of the fetus delivered spontaneously.     Upon delivery, the infant was placed on the mothers abdomen and the cord was clamped and cut. Delayed cord clamping was performed.  The infant was noted to cry spontaneously and was moving all extremities appropriately. There was no evidence for injury. Awaiting nurse resuscitators evaluated the . Arterial and venous cord blood gases and cord blood was collected for analysis. These were promptly sent to the lab. In the immediate post-partum, 30 units of IV pitocin was administered, and the uterus was noted to contract down well with massage and pitocin. The placenta delivered spontaneously at 02:48 and was noted to have a marginally inserted 3 vessel cord.     The vagina, cervix, perineum, and rectum were inspected and there was noted to be a 2nd degree laceration, which was repaired with 3-0 Vicryl rapide.    At the conclusion of the procedure, all needle, sponge, and instrument counts were noted to be correct. Patient tolerated the procedure well and was allowed to recover in labor and delivery room with family and  before being transferred to the post-partum floor. Dr. Kilgore was present and participated in all key portions of the case.      Janeth Corcoran MD  2024  3:39 AM

## 2024-08-06 NOTE — QUICK NOTE
Inderjit Matos 34 y.o. female MRN: [id number]  Unit/Bed#: -01 Encounter: [id number]    Assessment/Plan    Inderjit Matos is a 34 y.o.  who is PPD 0 s/p  at 39w1d     *  (spontaneous vaginal delivery)  Assessment & Plan  Routine postpartum care  Encourage ambulation  Encourage familial bonding  Lactation support as needed  Pain: Motrin/Tylenol around the clock, oxycodone if needed  Postpartum Contraceptive plan: Nexplanon, patient does not have insurance. Patient is interested to know the cost out of pocket. Will find out from [employer] and let the patient know. She is also open to taking progesterone only OCP's. F/u at PP visit outpatient.     Tachycardia  Assessment & Plan  During labor patient had an episode of hypotension w/ MAP 65 and . Patient continues to be tachycardic since delivery with -144, BP stable with MAP  >65. Patient is asymptomatic.     EKG: NSR    IVF and TSH ordered by Dr. Daniels  F/u labs  Monitor v/s  Consider echo and outpatient cardiology follow up if tachycardia persists    Anemia in pregnancy  Assessment & Plan  Treated with Iron supplements during pregnancy     Admission Hb: 12.7. Blood loss 426 cc.     Subjective/Objective     Subjective: Postpartum state    Patient was seen and examined at bedside. Patient is doing well and resting comfortably in bed. She complains of 5/10 vaginal pain. Patient denies nausea and vomiting and is able to tolerate diet. She denies CP, SOB, LE swelling, fever, and chills. Patient is breast feeding.     Patient denies any PMHx of cardiac disease. Family history is significant for CAD in mother.     Pain: no  Tolerating PO: yes  Voiding: yes  Flatus: yes  BM: no   Ambulating: yes  Breastfeeding: Breastfeeding  Chest pain: no  Shortness of breath: no  Leg pain: no  Lochia: appropriate     Objective:     Vitals:  Vitals:    24 0430 24 0445 24 0500 24 0805   BP: 112/66 110/70  107/66 104/84   Pulse: (!) 126 (!) 123 (!) 130 (!) 144   Resp:    20   Temp:    99 °F (37.2 °C)   TempSrc:    Oral   SpO2:    99%       Physical Exam:   GEN: appears well, alert and oriented x 3, pleasant and cooperative   CV: Tachycardia  RESP: non labored breathing  ABDOMEN: soft, no tenderness, no distention, Uterine fundus firm and non-tender, -2 cm below the umbilicus  EXTREMITIES: non-tender  NEURO Alert and oriented to person, place, and time.       Lab Results   Component Value Date    WBC 10.85 (H) 08/05/2024    HGB 12.7 08/05/2024    HCT 37.1 08/05/2024    MCV 87 08/05/2024     08/05/2024     Oni Solano MD  SLW  PGY-2

## 2024-08-06 NOTE — ANESTHESIA POSTPROCEDURE EVALUATION
Post-Op Assessment Note          Post-op block assessment: catheter intact                  BP   109/63   Temp   N/a   Pulse  114   Resp   16   SpO2   97

## 2024-08-06 NOTE — ASSESSMENT & PLAN NOTE
During labor patient had an episode of hypotension w/ MAP 65 and . Patient continues to be tachycardic since delivery with -144, BP stable with MAP  >65. Patient is asymptomatic.     EKG: NSR  TSH 8/6 wnl: 3.932    S/p IVF  Consider echo and outpatient cardiology follow up

## 2024-08-06 NOTE — PLAN OF CARE
Problem: PAIN - ADULT  Goal: Verbalizes/displays adequate comfort level or baseline comfort level  Description: Interventions:  - Encourage patient to monitor pain and request assistance  - Assess pain using appropriate pain scale  - Administer analgesics based on type and severity of pain and evaluate response  - Implement non-pharmacological measures as appropriate and evaluate response  - Consider cultural and social influences on pain and pain management  - Notify physician/advanced practitioner if interventions unsuccessful or patient reports new pain  Outcome: Progressing     Problem: INFECTION - ADULT  Goal: Absence or prevention of progression during hospitalization  Description: INTERVENTIONS:  - Assess and monitor for signs and symptoms of infection  - Monitor lab/diagnostic results  - Monitor all insertion sites, i.e. indwelling lines, tubes, and drains  - Monitor endotracheal if appropriate and nasal secretions for changes in amount and color  - [place] appropriate cooling/warming therapies per order  - Administer medications as ordered  - Instruct and encourage patient and family to use good hand hygiene technique  - Identify and instruct in appropriate isolation precautions for identified infection/condition  Outcome: Progressing  Goal: Absence of fever/infection during neutropenic period  Description: INTERVENTIONS:  - Monitor WBC    Outcome: Progressing     Problem: SAFETY ADULT  Goal: Patient will remain free of falls  Description: INTERVENTIONS:  - Educate patient/family on patient safety including physical limitations  - Instruct patient to call for assistance with activity   - Consult OT/PT to assist with strengthening/mobility   - Keep Call bell within reach  - Keep bed low and locked with side rails adjusted as appropriate  - Keep care items and personal belongings within reach  - Initiate and maintain comfort rounds  - Make Fall Risk Sign visible to staff  - Apply yellow socks and bracelet  for high fall risk patients  - Consider moving patient to room near nurses station  Outcome: Progressing  Goal: Maintain or return to baseline ADL function  Description: INTERVENTIONS:  -  Assess patient's ability to carry out ADLs; assess patient's baseline for ADL function and identify physical deficits which impact ability to perform ADLs (bathing, care of mouth/teeth, toileting, grooming, dressing, etc.)  - Assess/evaluate cause of self-care deficits   - Assess range of motion  - Assess patient's mobility; develop plan if impaired  - Assess patient's need for assistive devices and provide as appropriate  - Encourage maximum independence but intervene and supervise when necessary  - Involve family in performance of ADLs  - Assess for home care needs following discharge   - Consider OT consult to assist with ADL evaluation and planning for discharge  - Provide patient education as appropriate  Outcome: Progressing  Goal: Maintains/Returns to pre admission functional level  Description: INTERVENTIONS:  - Perform AM-PAC 6 Click Basic Mobility/ Daily Activity assessment daily.  - Set and communicate daily mobility goal to care team and patient/family/caregiver.   - Collaborate with rehabilitation services on mobility goals if consulted  - Out of bed for toileting  - Record patient progress and toleration of activity level   Outcome: Progressing     Problem: DISCHARGE PLANNING  Goal: Discharge to home or other facility with appropriate resources  Description: INTERVENTIONS:  - Identify barriers to discharge w/patient and caregiver  - Arrange for needed discharge resources and transportation as appropriate  - Identify discharge learning needs (meds, wound care, etc.)  - Arrange for interpretive services to assist at discharge as needed  - Refer to Case Management Department for coordinating discharge planning if the patient needs post-hospital services based on physician/advanced practitioner order or complex needs  related to functional status, cognitive ability, or social support system  Outcome: Progressing     Problem: POSTPARTUM  Goal: Experiences normal postpartum course  Description: INTERVENTIONS:  - Monitor maternal vital signs  - Assess uterine involution and lochia  Outcome: Progressing  Goal: Appropriate maternal -  bonding  Description: INTERVENTIONS:  - Identify family support  - Assess for appropriate maternal/infant bonding   -Encourage maternal/infant bonding opportunities  - Referral to  or  as needed  Outcome: Progressing  Goal: Establishment of infant feeding pattern  Description: INTERVENTIONS:  - Assess breast/bottle feeding  - Refer to lactation as needed  Outcome: Progressing  Goal: Incision(s), wounds(s) or drain site(s) healing without S/S of infection  Description: INTERVENTIONS  - Assess and document dressing, incision, wound bed, drain sites and surrounding tissue  - Provide patient and family education    Outcome: Progressing

## 2024-08-06 NOTE — OB LABOR/OXYTOCIN SAFETY PROGRESS
Labor Progress Note - Inderjit Matos 34 y.o. female MRN: [id number]    Unit/Bed#: -01 Encounter: [id number]       Contraction Frequency (minutes): 4  Contraction Intensity: Moderate/Strong  Uterine Activity Characteristics: Regular  Cervical Dilation: 10  Dilation Complete Date: 08/06/24  Dilation Complete Time: 0207  Cervical Effacement: 100  Fetal Station: 1  Baseline Rate (FHR): 140 bpm  Fetal Heart Rate (FHT): 140 BPM  FHR Category: I               Vital Signs:   Vitals:    08/06/24 0130   BP: 104/69   Pulse: (!) 125   Resp:    Temp:    SpO2:        Notes/comments:   Patient reports increased vaginal pressure. Will begin pushing at this time. Anticipate vaginal delivery.     Janeth Corcoran MD 8/6/2024 2:09 AM

## 2024-08-06 NOTE — DISCHARGE SUMMARY
Discharge Summary - OB/GYN   Inderjit Matos 34 y.o. female MRN: [id number]  Unit/Bed#: -01 Encounter: [id number]      Admission Date: 2024     Discharge Date: 2024    Admitting Diagnosis:   1. Pregnancy at 39w1d  2. Labor    Discharge Diagnosis:   Same, delivered      Procedures: spontaneous vaginal delivery    Attending: Brunilda Kilgore MD    Hospital Course:     Inderjit Matos is a 34 y.o.  at 39w1d wks who was initially admitted for contractions. During her initial check at 15:14, she was 5/70/-2. She remained unchanged and underwent aROM with clear fluid at 18:57. She progressed to full cervical dilation at 02:08 and began pushing at 02:25.     She delivered a viable female  on 2024 at 02:41. Weight 8lbs 5oz via spontaneous vaginal delivery. Apgars were 8 (1 min) and 9 (5 min).  was transferred to  nursery. Patient tolerated the procedure well and was transferred to recovery in stable condition.     Her post-partum course was complicated by tachycardia and hypotension, which has since resolved with IV fluids. She remains asymptomatic. Her post-partum pain was well controlled with oral analgesics.    On day of discharge, she was ambulating and able to reasonably perform all ADLs. She was voiding and had appropriate bowel function. Pain was well controlled. She was discharged home on post-partum day #1 without complications. Patient was instructed to follow up with her OB as an outpatient and was given appropriate warnings to call provider if she develops signs of infection or uncontrolled pain.    Complications: none apparent    Condition at discharge: stable     Discharge instructions/Information to patient and family:   See after visit summary for information provided to patient and family.      Provisions for Follow-Up Care:  See after visit summary for information related to follow-up care and any pertinent home health orders.       Disposition: Home    Planned Readmission: No    Discharge Medications:  For a complete list of the patient's medications, please refer to her med rec.

## 2024-08-06 NOTE — ASSESSMENT & PLAN NOTE
Routine postpartum care  Encourage ambulation  Encourage familial bonding  Lactation support as needed  Pain: Motrin/Tylenol around the clock, oxycodone if needed  Postpartum Contraceptive plan: Nexplanon, patient does not have insurance. Will find out cost out of pocket from [employer] and let the patient know. She is also open to taking progesterone only OCP's

## 2024-08-06 NOTE — OB LABOR/OXYTOCIN SAFETY PROGRESS
Labor Progress Note - Dairy Krystal Matos 34 y.o. female MRN: [id number]    Unit/Bed#: -01 Encounter: [id number]       Contraction Frequency (minutes): 2.5-4  Contraction Intensity: Moderate/Strong  Uterine Activity Characteristics: Regular  Cervical Dilation: 7        Cervical Effacement: 90  Fetal Station: -2  Baseline Rate (FHR): 150 bpm  Fetal Heart Rate (FHT): 140 BPM  FHR Category: I               Vital Signs:   Vitals:    08/05/24 2315   BP: 97/53   Pulse: (!) 114   Resp:    Temp:    SpO2: 100%       Notes/comments:   Patient resting comfortably in bed.   Category I tracing, baseline of 145-150, moderate variability, accelerations noted, no decelerations, contractions every 2-3 minutes.   Continue to hold off Pitocin at this time due to cervical change.     Dr. Kilgore aware.    Janeth Corcoran MD 8/5/2024 11:57 PM

## 2024-08-06 NOTE — LACTATION NOTE
This note was copied from a baby's chart.  CONSULT - LACTATION  Baby Girl (Dairy) Jamie Matos 0 days female MRN: [id number]    [facility] NURSERY Room / Bed: (N)/(N) Encounter: [id number]    Maternal Information     MOTHER:  Jamie Matos,Dairy Krystal  Maternal Age: 34 y.o.  OB History: # 1 - Date: 24, Sex: Female, Weight: 3770 g (8 lb 5 oz), GA: 39w1d, Type: Vaginal, Spontaneous, Apgar1: 8, Apgar5: 9, Living: Living, Birth Comments: None   Previouse breast reduction surgery? No    Lactation history:   Has patient previously breast fed: No   How long had patient previously breast fed:     Previous breast feeding complications:     History reviewed. No pertinent surgical history.    Birth information:  YOB: 2023   Time of birth: 2:41 AM   Sex: female   Delivery type: Vaginal, Spontaneous   Birth Weight: 3770 g (8 lb 5 oz)   Percent of Weight Change: 0%     Gestational Age: 39w1d   [unfilled]    Assessment     Breast and nipple assessment:  No assessment performed     Assessment:  No assessment performed, infant sleeping in bassinet    Feeding assessment: feeding well, per mother  LATCH: No latch observed by lactation    Feeding recommendations:  breast feed on demand  Consult completed with . Mother reports breastfeeding has been going well. Mother offering one breast per feeding, educated on offering both breasts with each breast.     RSB and DC booklets provided in [place].   Manual hand pumps provided d/t inability to obtain electric pumps.     Encouraged to call for latch assess, ext. Provided.     Met with mother. Provided mother with Ready, Set, Baby booklet.    Information on hand expression given. Discussed benefits of knowing how to manually express breast including stimulating milk supply, softening nipple for latch and evacuating breast in the event of engorgement.    Mom is encouraged to place baby skin to skin  for feedings. Skin to skin education provided for baby placement on mother's chest, baby only in diaper, blankets below shoulders on baby's back. Skin to skin is encouraged to continue at home for feedings and between feedings.    - Start feedings on breast that last feeding ended   - allow no more than 3 hours between breast feeding sessions   - time between feedings is counted from the beginning of the first feed to the beginning of the next feeding session    Reviewed early signs of hunger, including tensing of hands and shoulders - no need to wait for open eyes.  Crying is a late hunger sign.  If baby is crying, soothe baby first and then attempt to latch.  Reviewed normal sucking patterns: transition from stimulation to nutritive to release or non-nutritive. The goal is to see and hear lots of swallowing.    Reviewed normal nursing pattern: infant could latch on one breast up to 30 minutes or until releases on own. Signs of satiation is open hand with fingers that do not grab your finger.  Discussed difference in sensation of non-nutritive v nutritive sucking.    Discussed Skin to Skin contact an benefits to mom and baby.  Talked about the delay of the first bath until baby has adjusted. Spoke about the benefits of rooming in. Feeding on cue and what that means for recognizing infant's hunger. Avoidance of pacifiers for the first month discussed. Talked about exclusive breastfeeding for the first 6 months.    Positioning and latch reviewed as well as showing images of other feeding positions.  Discussed the properties of a good latch in any position. Reviewed hand/manual expression.  Discussed s/s that baby is getting enough milk and some s/s that breastfeeding dyad may need further help.    Gave information on common concerns, what to expect the first few weeks after delivery, preparing for other caregivers, and how partners can help. Resources for support also provided.    Encouraged parents to call for  assistance, questions, and concerns about breastfeeding.  Extension provided.      Bibi Silvestre RN 8/6/2024 6:58 PM

## 2024-08-06 NOTE — PLAN OF CARE
Problem: PAIN - ADULT  Goal: Verbalizes/displays adequate comfort level or baseline comfort level  Description: Interventions:  - Encourage patient to monitor pain and request assistance  - Assess pain using appropriate pain scale  - Administer analgesics based on type and severity of pain and evaluate response  - Implement non-pharmacological measures as appropriate and evaluate response  - Consider cultural and social influences on pain and pain management  - Notify physician/advanced practitioner if interventions unsuccessful or patient reports new pain  Outcome: Progressing     Problem: INFECTION - ADULT  Goal: Absence or prevention of progression during hospitalization  Description: INTERVENTIONS:  - Assess and monitor for signs and symptoms of infection  - Monitor lab/diagnostic results  - Monitor all insertion sites, i.e. indwelling lines, tubes, and drains  - Monitor endotracheal if appropriate and nasal secretions for changes in amount and color  - [place] appropriate cooling/warming therapies per order  - Administer medications as ordered  - Instruct and encourage patient and family to use good hand hygiene technique  - Identify and instruct in appropriate isolation precautions for identified infection/condition  Outcome: Progressing  Goal: Absence of fever/infection during neutropenic period  Description: INTERVENTIONS:  - Monitor WBC    Outcome: Progressing     Problem: SAFETY ADULT  Goal: Patient will remain free of falls  Description: INTERVENTIONS:  - Educate patient/family on patient safety including physical limitations  - Instruct patient to call for assistance with activity   - Consult OT/PT to assist with strengthening/mobility   - Keep Call bell within reach  - Keep bed low and locked with side rails adjusted as appropriate  - Keep care items and personal belongings within reach  - Initiate and maintain comfort rounds  - Make Fall Risk Sign visible to staff  - Apply yellow socks and bracelet  for high fall risk patients  - Consider moving patient to room near nurses station  Outcome: Progressing  Goal: Maintain or return to baseline ADL function  Description: INTERVENTIONS:  -  Assess patient's ability to carry out ADLs; assess patient's baseline for ADL function and identify physical deficits which impact ability to perform ADLs (bathing, care of mouth/teeth, toileting, grooming, dressing, etc.)  - Assess/evaluate cause of self-care deficits   - Assess range of motion  - Assess patient's mobility; develop plan if impaired  - Assess patient's need for assistive devices and provide as appropriate  - Encourage maximum independence but intervene and supervise when necessary  - Involve family in performance of ADLs  - Assess for home care needs following discharge   - Consider OT consult to assist with ADL evaluation and planning for discharge  - Provide patient education as appropriate  Outcome: Progressing  Goal: Maintains/Returns to pre admission functional level  Description: INTERVENTIONS:  - Perform AM-PAC 6 Click Basic Mobility/ Daily Activity assessment daily.  - Set and communicate daily mobility goal to care team and patient/family/caregiver.   - Collaborate with rehabilitation services on mobility goals if consulted  - Out of bed for toileting  - Record patient progress and toleration of activity level   Outcome: Progressing     Problem: DISCHARGE PLANNING  Goal: Discharge to home or other facility with appropriate resources  Description: INTERVENTIONS:  - Identify barriers to discharge w/patient and caregiver  - Arrange for needed discharge resources and transportation as appropriate  - Identify discharge learning needs (meds, wound care, etc.)  - Arrange for interpretive services to assist at discharge as needed  - Refer to Case Management Department for coordinating discharge planning if the patient needs post-hospital services based on physician/advanced practitioner order or complex needs  related to functional status, cognitive ability, or social support system  Outcome: Progressing

## 2024-08-06 NOTE — OB LABOR/OXYTOCIN SAFETY PROGRESS
Labor Progress Note - Dairy Krystal Matos 34 y.o. female MRN: [id number]    Unit/Bed#: -01 Encounter: [id number]       Contraction Frequency (minutes): 1.5-3  Contraction Intensity: Moderate/Strong  Uterine Activity Characteristics: Regular  Cervical Dilation: 6        Cervical Effacement: 80  Fetal Station: -2  Baseline Rate (FHR): 140 bpm  Fetal Heart Rate (FHT): 138 BPM  FHR Category: Category I               Vital Signs:   Vitals:    08/05/24 1847   BP: 115/74   Pulse: 89   Resp:    Temp:        Notes/comments:  Patient resting comfortably in bed.   Category I tracing, baseline of 145, moderate variability, accelerations noted, no decelerations, contractions every 2-3 minutes.   Will hold off on Pitocin at this time due to cervical change. Reevaluation at next check.     Dr. Kilgore aware.    Janeth Corcoran MD 8/5/2024 9:33 PM

## 2024-08-07 VITALS
HEART RATE: 91 BPM | DIASTOLIC BLOOD PRESSURE: 77 MMHG | SYSTOLIC BLOOD PRESSURE: 106 MMHG | OXYGEN SATURATION: 98 % | RESPIRATION RATE: 18 BRPM | TEMPERATURE: 98 F

## 2024-08-07 PROCEDURE — 99024 POSTOP FOLLOW-UP VISIT: CPT | Performed by: OBSTETRICS & GYNECOLOGY

## 2024-08-07 PROCEDURE — NC001 PR NO CHARGE: Performed by: OBSTETRICS & GYNECOLOGY

## 2024-08-07 RX ORDER — IBUPROFEN 600 MG/1
600 TABLET ORAL EVERY 6 HOURS PRN
Qty: 30 TABLET | Refills: 0 | Status: SHIPPED | OUTPATIENT
Start: 2024-08-07

## 2024-08-07 RX ORDER — ACETAMINOPHEN 325 MG/1
650 TABLET ORAL EVERY 4 HOURS PRN
Start: 2024-08-07

## 2024-08-07 RX ORDER — CALCIUM CARBONATE 500 MG/1
1000 TABLET, CHEWABLE ORAL 3 TIMES DAILY PRN
Start: 2024-08-07

## 2024-08-07 RX ADMIN — DOCUSATE SODIUM 100 MG: 100 CAPSULE, LIQUID FILLED ORAL at 10:48

## 2024-08-07 RX ADMIN — IBUPROFEN 600 MG: 600 TABLET ORAL at 10:48

## 2024-08-07 RX ADMIN — ACETAMINOPHEN 650 MG: 325 TABLET, FILM COATED ORAL at 06:40

## 2024-08-07 NOTE — PROGRESS NOTES
Progress Note - OB/GYN  Inderjit Matos 34 y.o. female MRN: [id number]  Unit/Bed#: -01 Encounter: [id number]    Assessment and Plan     Inderjit Matos is a patient of: [facility]. She is PPD# 1 s/p  spontaneous vaginal delivery  Recovering well and is stable.       Tachycardia  Assessment & Plan  During labor patient had an episode of hypotension w/ MAP 65 and . Patient continues to be tachycardic since delivery with -144, BP stable with MAP  >65. Patient is asymptomatic.     EKG: NSR  TSH  wnl: 3.932    S/p IVF  Consider echo and outpatient cardiology follow up     Anemia in pregnancy  Assessment & Plan  Treated with Iron supplements during pregnancy     Admission Hb: 12.7. Blood loss 426 cc.     Stable. F/u outpatient with PCP    *  (spontaneous vaginal delivery)  Assessment & Plan  Routine postpartum care  Encourage ambulation  Encourage familial bonding  Lactation support as needed  Pain: Motrin/Tylenol around the clock, oxycodone if needed  Postpartum Contraceptive plan: Nexplanon, patient does not have insurance. Will find out cost out of pocket from [place] and let the patient know. 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 She has no current complaints.  Pain is well controlled.  Patient is currently voiding.  She is ambulating.  Patient is currently passing flatus and has had bowel movement. She is tolerating PO, and denies nausea or vomitting. Patient denies fever, chills, chest pain, shortness of breath, or calf tenderness. Lochia is normal. She is  Breastfeeding. She is recovering well and is stable.       Vitals:   BP (!) 89/61 (BP Location: Left arm)   Pulse 101   Temp 98.1 °F (36.7 °C) (Oral)   Resp 17   LMP 2023   SpO2 99%   Breastfeeding Yes       Intake/Output Summary (Last 24 hours) at 2024 0546  Last data filed at 2024 1404  Gross per 24 hour   Intake 1000 ml   Output 500 ml   Net 500 ml       Invasive Devices       Peripheral Intravenous Line  Duration             Peripheral IV 24 Distal;Left;Ventral (anterior) Forearm 1 day                    Physical Exam:   GEN: Inderjit Matos appears well, alert and oriented x 3, pleasant and cooperative   CARDIO: RRR, no murmurs or rubs  RESP:  CTAB, no wheezes or rales  ABDOMEN: soft, no tenderness, no distention  EXTREMITIES: SCDs on, non tender, no erythema, b/l Saad's sign negative      Labs:     Hemoglobin   Date Value Ref Range Status   2024 12.7 11.5 - 15.4 g/dL Final   2024 10.8 (L) 11.5 - 15.4 g/dL Final     WBC   Date Value Ref Range Status   2024 10.85 (H) 4.31 - 10.16 Thousand/uL Final   2024 6.01 4.31 - 10.16 Thousand/uL Final     Platelets   Date Value Ref Range Status   2024 208 149 - 390 Thousands/uL Final   2024 204 149 - 390  Thousands/uL Final     Creatinine   Date Value Ref Range Status   08/05/2024 0.56 (L) 0.60 - 1.30 mg/dL Final     Comment:     Standardized to IDMS reference method   05/17/2024 0.54 (L) 0.60 - 1.30 mg/dL Final     Comment:     Standardized to IDMS reference method     AST   Date Value Ref Range Status   08/05/2024 15 13 - 39 U/L Final   05/17/2024 27 13 - 39 U/L Final     ALT   Date Value Ref Range Status   08/05/2024 10 7 - 52 U/L Final     Comment:     Specimen collection should occur prior to Sulfasalazine administration due to the potential for falsely depressed results.    05/17/2024 20 7 - 52 U/L Final     Comment:     Specimen collection should occur prior to Sulfasalazine administration due to the potential for falsely depressed results.           Janeth Corcoran MD  8/7/2024  5:46 AM

## 2024-08-07 NOTE — PLAN OF CARE
Problem: PAIN - ADULT  Goal: Verbalizes/displays adequate comfort level or baseline comfort level  Description: Interventions:  - Encourage patient to monitor pain and request assistance  - Assess pain using appropriate pain scale  - Administer analgesics based on type and severity of pain and evaluate response  - Implement non-pharmacological measures as appropriate and evaluate response  - Consider cultural and social influences on pain and pain management  - Notify physician/advanced practitioner if interventions unsuccessful or patient reports new pain  Outcome: Adequate for Discharge     Problem: INFECTION - ADULT  Goal: Absence or prevention of progression during hospitalization  Description: INTERVENTIONS:  - Assess and monitor for signs and symptoms of infection  - Monitor lab/diagnostic results  - Monitor all insertion sites, i.e. indwelling lines, tubes, and drains  - Monitor endotracheal if appropriate and nasal secretions for changes in amount and color  - [place] appropriate cooling/warming therapies per order  - Administer medications as ordered  - Instruct and encourage patient and family to use good hand hygiene technique  - Identify and instruct in appropriate isolation precautions for identified infection/condition  Outcome: Adequate for Discharge  Goal: Absence of fever/infection during neutropenic period  Description: INTERVENTIONS:  - Monitor WBC    Outcome: Adequate for Discharge     Problem: SAFETY ADULT  Goal: Patient will remain free of falls  Description: INTERVENTIONS:  - Educate patient/family on patient safety including physical limitations  - Instruct patient to call for assistance with activity   - Consult OT/PT to assist with strengthening/mobility   - Keep Call bell within reach  - Keep bed low and locked with side rails adjusted as appropriate  - Keep care items and personal belongings within reach  - Initiate and maintain comfort rounds  - Make Fall Risk Sign visible to  staff    Outcome: Adequate for Discharge  Goal: Maintain or return to baseline ADL function  Description: INTERVENTIONS:  -  Assess patient's ability to carry out ADLs; assess patient's baseline for ADL function and identify physical deficits which impact ability to perform ADLs (bathing, care of mouth/teeth, toileting, grooming, dressing, etc.)  - Assess/evaluate cause of self-care deficits   - Assess range of motion  - Assess patient's mobility; develop plan if impaired  - Assess patient's need for assistive devices and provide as appropriate  - Encourage maximum independence but intervene and supervise when necessary  - Involve family in performance of ADLs  - Assess for home care needs following discharge   - Consider OT consult to assist with ADL evaluation and planning for discharge  - Provide patient education as appropriate  Outcome: Adequate for Discharge  Goal: Maintains/Returns to pre admission functional level  Description: INTERVENTIONS:  - Perform AM-PAC 6 Click Basic Mobility/ Daily Activity assessment daily.  - Set and communicate daily mobility goal to care team and patient/family/caregiver.     - Out of bed for toileting  - Record patient progress and toleration of activity level   Outcome: Adequate for Discharge     Problem: DISCHARGE PLANNING  Goal: Discharge to home or other facility with appropriate resources  Description: INTERVENTIONS:  - Identify barriers to discharge w/patient and caregiver  - Arrange for needed discharge resources and transportation as appropriate  - Identify discharge learning needs (meds, wound care, etc.)  - Arrange for interpretive services to assist at discharge as needed  - Refer to Case Management Department for coordinating discharge planning if the patient needs post-hospital services based on physician/advanced practitioner order or complex needs related to functional status, cognitive ability, or social support system  Outcome: Adequate for Discharge      Problem: POSTPARTUM  Goal: Experiences normal postpartum course  Description: INTERVENTIONS:  - Monitor maternal vital signs  - Assess uterine involution and lochia  Outcome: Adequate for Discharge  Goal: Appropriate maternal -  bonding  Description: INTERVENTIONS:  - Identify family support  - Assess for appropriate maternal/infant bonding   -Encourage maternal/infant bonding opportunities  - Referral to  or  as needed  Outcome: Adequate for Discharge  Goal: Establishment of infant feeding pattern  Description: INTERVENTIONS:  - Assess breast/bottle feeding  - Refer to lactation as needed  Outcome: Adequate for Discharge  Goal: Incision(s), wounds(s) or drain site(s) healing without S/S of infection  Description: INTERVENTIONS  - Assess and document dressing, incision, wound bed, drain sites and surrounding tissue  - Provide patient and family education  - Perform skin care/dressing changes every day  Outcome: Adequate for Discharge

## 2024-08-07 NOTE — PLAN OF CARE
Problem: PAIN - ADULT  Goal: Verbalizes/displays adequate comfort level or baseline comfort level  Description: Interventions:  - Encourage patient to monitor pain and request assistance  - Assess pain using appropriate pain scale  - Administer analgesics based on type and severity of pain and evaluate response  - Implement non-pharmacological measures as appropriate and evaluate response  - Consider cultural and social influences on pain and pain management  - Notify physician/advanced practitioner if interventions unsuccessful or patient reports new pain  Outcome: Progressing     Problem: INFECTION - ADULT  Goal: Absence or prevention of progression during hospitalization  Description: INTERVENTIONS:  - Assess and monitor for signs and symptoms of infection  - Monitor lab/diagnostic results  - Monitor all insertion sites, i.e. indwelling lines, tubes, and drains  - Monitor endotracheal if appropriate and nasal secretions for changes in amount and color  - [place] appropriate cooling/warming therapies per order  - Administer medications as ordered  - Instruct and encourage patient and family to use good hand hygiene technique  - Identify and instruct in appropriate isolation precautions for identified infection/condition  Outcome: Progressing  Goal: Absence of fever/infection during neutropenic period  Description: INTERVENTIONS:  - Monitor WBC    Outcome: Progressing     Problem: SAFETY ADULT  Goal: Patient will remain free of falls  Description: INTERVENTIONS:  - Educate patient/family on patient safety including physical limitations  - Instruct patient to call for assistance with activity   - Consult OT/PT to assist with strengthening/mobility   - Keep Call bell within reach  - Keep bed low and locked with side rails adjusted as appropriate  - Keep care items and personal belongings within reach  - Initiate and maintain comfort rounds  - Make Fall Risk Sign visible to staff  - Offer Toileting every  Hours,  in advance of need  - Initiate/Maintain alarm  - Obtain necessary fall risk management equipment:   - Apply yellow socks and bracelet for high fall risk patients  - Consider moving patient to room near nurses station  Outcome: Progressing  Goal: Maintain or return to baseline ADL function  Description: INTERVENTIONS:  -  Assess patient's ability to carry out ADLs; assess patient's baseline for ADL function and identify physical deficits which impact ability to perform ADLs (bathing, care of mouth/teeth, toileting, grooming, dressing, etc.)  - Assess/evaluate cause of self-care deficits   - Assess range of motion  - Assess patient's mobility; develop plan if impaired  - Assess patient's need for assistive devices and provide as appropriate  - Encourage maximum independence but intervene and supervise when necessary  - Involve family in performance of ADLs  - Assess for home care needs following discharge   - Consider OT consult to assist with ADL evaluation and planning for discharge  - Provide patient education as appropriate  Outcome: Progressing  Goal: Maintains/Returns to pre admission functional level  Description: INTERVENTIONS:  - Perform AM-PAC 6 Click Basic Mobility/ Daily Activity assessment daily.  - Set and communicate daily mobility goal to care team and patient/family/caregiver.   - Collaborate with rehabilitation services on mobility goals if consulted  - Perform Range of Motion  times a day.  - Reposition patient every  hours.  - Dangle patient  times a day  - Stand patient  times a day  - Ambulate patient  times a day  - Out of bed to chair  times a day   - Out of bed for meals  times a day  - Out of bed for toileting  - Record patient progress and toleration of activity level   Outcome: Progressing     Problem: DISCHARGE PLANNING  Goal: Discharge to home or other facility with appropriate resources  Description: INTERVENTIONS:  - Identify barriers to discharge w/patient and caregiver  - Arrange for  needed discharge resources and transportation as appropriate  - Identify discharge learning needs (meds, wound care, etc.)  - Arrange for interpretive services to assist at discharge as needed  - Refer to Case Management Department for coordinating discharge planning if the patient needs post-hospital services based on physician/advanced practitioner order or complex needs related to functional status, cognitive ability, or social support system  Outcome: Progressing     Problem: POSTPARTUM  Goal: Experiences normal postpartum course  Description: INTERVENTIONS:  - Monitor maternal vital signs  - Assess uterine involution and lochia  Outcome: Progressing  Goal: Appropriate maternal -  bonding  Description: INTERVENTIONS:  - Identify family support  - Assess for appropriate maternal/infant bonding   -Encourage maternal/infant bonding opportunities  - Referral to  or  as needed  Outcome: Progressing  Goal: Establishment of infant feeding pattern  Description: INTERVENTIONS:  - Assess breast/bottle feeding  - Refer to lactation as needed  Outcome: Progressing  Goal: Incision(s), wounds(s) or drain site(s) healing without S/S of infection  Description: INTERVENTIONS  - Assess and document dressing, incision, wound bed, drain sites and surrounding tissue  - Provide patient and family education  - Perform skin care/dressing changes every   Outcome: Progressing

## 2024-08-07 NOTE — LACTATION NOTE
This note was copied from a baby's chart.  CONSULT - LACTATION  Baby Girl (Dairy) Jamie Matos 1 days female MRN: [id number]    [facility] NURSERY Room / Bed: (N)/(N) Encounter: [id number]    Maternal Information     MOTHER:  Jamie Matos,Dairy Krystal  Maternal Age: 34 y.o.  OB History: # 1 - Date: 24, Sex: Female, Weight: 3770 g (8 lb 5 oz), GA: 39w1d, Type: Vaginal, Spontaneous, Apgar1: 8, Apgar5: 9, Living: Living, Birth Comments: None   Previouse breast reduction surgery? No    Lactation history:   Has patient previously breast fed: No   How long had patient previously breast fed:     Previous breast feeding complications:     History reviewed. No pertinent surgical history.    Birth information:  YOB: 2023   Time of birth: 2:41 AM   Sex: female   Delivery type: Vaginal, Spontaneous   Birth Weight: 3770 g (8 lb 5 oz)   Percent of Weight Change: -4%     Gestational Age: 39w1d   [unfilled]    Assessment     Breast and nipple assessment:  bilateral clonincal breasts. Cracked and sore nipples on nipple faces.      Assessment: normal assessment    Feeding assessment: feeding well  LATCH:  Latch: Grasps breast, tongue down, lips flanged, rhythmic sucking   Audible Swallowing: Spontaneous and intermittent (24 hours old)   Type of Nipple: Everted (After stimulation)   Comfort (Breast/Nipple): Filling, red/small blisters/bruises, mild/moderate discomfort   Hold (Positioning): Partial assist, teach one side, mother does other, staff holds   LATCH Score: 8           24 0900   Lactation Consultation   Reason for Consult 20 min;20 minutes   Lactation Consultant Total Time 40   Risk Factors Language barrier  ( #[id number])   Maternal Information   Has mother  before? No   Infant to breast within first hour of birth? Yes   Exclusive Pump and Bottle Feed No   LATCH Documentation   Latch 2   Audible Swallowing 2   Type of  Nipple 2   Comfort (Breast/Nipple) 1   Hold (Positioning) 1   LATCH Score 8   Having latch problems? No   Position(s) Used Cross Cradle   Breasts/Nipples   Left Breast Soft  (triangular breast)   Right Breast Soft  (Triangular breast)   Left Nipple Cracked;Sore;Everted   Right Nipple Cracked;Sore;Everted   Intervention Breast shells;Lanolin   Breastfeeding Status Yes   Breastfeeding Progress Not yet established;Breastfeeding well   Breast Pump   Pump 1   Patient Follow-Up   Lactation Consult Status 2   Follow-Up Type Inpatient;Call as needed   Other OB Lactation Documentation    Additional Problem Noted Mom c/o sore tender nipples. Breast shells and lanolin given for healing. Assisted mom in latching baby to breast on right breast. Baby latched deeply with minimal discomfort to mom. Brought up S2S and transitioned to left breast. Mom latched baby deeply in cross cradle hold on left breast. Encouraged to call for further assistance.     Feeding recommendations:  breast feed on demand    Provided demonstration, education and support of deep latch to breast by placing the nipple to the nose, dragging down to chin to achieve a wide latch. Bring baby to the breast, not breast to baby. Move your shoulders down and away from your ears. Look for ear, shoulder, hip alignment. Baby's upper and lower lip should be flanged on the breast.    C/O sore nipples.  Information given about sore nipples and how to correct with positioning techniques. Discussed maneuvers to latch infant on properly to avoid nipple pain and promote healing.  Discussed treatments that could be utilized to promote healing.     Encouraged parents to call for assistance, questions, and concerns about breastfeeding.  Extension provided.  \  Alyssa Gerber MA 8/7/2024 9:51 AM

## 2024-08-08 ENCOUNTER — TELEPHONE (OUTPATIENT)
Age: 34
End: 2024-08-08

## 2024-08-08 NOTE — TELEPHONE ENCOUNTER
----- Message from Oni Solano MD sent at 8/6/2024  2:56 PM EDT -----  Regarding: OB post partum appointment  Hi,    This patient will most likely be discharged from L&D tomorrow. Can you call her to schedule a post partum appointment with nexplanon insertion WITH ME next Tuesday, 8/13 when Dr. Mccurdy is in the office.     I have admin time in the morning, but Arturo if it is okay with you and if you can open up my schedule for this appointment please?     Also, [place] speaking patient, so Evelia if you can please help with calling the patient to schedule an appointment.     Thank you for your help!  Dr. Solano

## 2024-08-08 NOTE — TELEPHONE ENCOUNTER
Completed form has been faxed to the number listed below copy has been attached to this encounter.     [phone]

## 2024-08-09 ENCOUNTER — TRANSITIONAL CARE MANAGEMENT (OUTPATIENT)
Age: 34
End: 2024-08-09

## 2024-08-13 ENCOUNTER — PROCEDURE VISIT (OUTPATIENT)
Age: 34
End: 2024-08-13

## 2024-08-13 VITALS
HEIGHT: 64 IN | SYSTOLIC BLOOD PRESSURE: 119 MMHG | DIASTOLIC BLOOD PRESSURE: 82 MMHG | HEART RATE: 72 BPM | RESPIRATION RATE: 16 BRPM | BODY MASS INDEX: 27.25 KG/M2 | WEIGHT: 159.6 LBS | OXYGEN SATURATION: 99 % | TEMPERATURE: 98.2 F

## 2024-08-13 DIAGNOSIS — Z30.017 NEXPLANON INSERTION: Primary | ICD-10-CM

## 2024-08-13 LAB
PLACENTA IN STORAGE: NORMAL
SL AMB POCT URINE HCG: NEGATIVE

## 2024-08-13 PROCEDURE — 11981 INSERTION DRUG DLVR IMPLANT: CPT | Performed by: OBSTETRICS & GYNECOLOGY

## 2024-08-13 PROCEDURE — 81025 URINE PREGNANCY TEST: CPT | Performed by: OBSTETRICS & GYNECOLOGY

## 2024-08-22 ENCOUNTER — ROUTINE PRENATAL (OUTPATIENT)
Age: 34
End: 2024-08-22

## 2024-08-22 VITALS
HEIGHT: 64 IN | OXYGEN SATURATION: 99 % | WEIGHT: 154 LBS | RESPIRATION RATE: 16 BRPM | DIASTOLIC BLOOD PRESSURE: 80 MMHG | HEART RATE: 88 BPM | TEMPERATURE: 97.8 F | BODY MASS INDEX: 26.29 KG/M2 | SYSTOLIC BLOOD PRESSURE: 117 MMHG

## 2024-08-22 PROCEDURE — PNV: Performed by: OBSTETRICS & GYNECOLOGY

## 2024-08-22 NOTE — PROGRESS NOTES
Ambulatory Visit  Name: Inderjit Matos      : 1990      MRN: [id number]  Encounter Provider: Denise Solano MD  Encounter Date: 2024   Encounter department: [facility]    Assessment & Plan   1. Postpartum care following vaginal delivery  Assessment & Plan:  Patient presents to the clinic for postpartum examination at 3 weeks.  She delivered via  at 39 weeks 1 day on 2024. She had a 2 degree laceration repaired that was repaired.  She currently denies any postpartum depression or anxiety; PHQ-9 done today showed a score of 0.  She has been urinating well and has had regular bowel movements, denies any constipation or hemorrhoids.  She denies any burning with urination but has some burning pain in her vagina whenever she stands up for prolonged periods.  Patient has been breast-feeding and currently has no issues with her feeding. Patient complains of some brownish discharge but denies any bleeding.   Pt had Nexplanon inserted 2 weeks ago and denies any side effects from that.     Speculum exam was done which did not show any lacerations or bleeding, some brownish discharge present. Patient had some pain on speculum insertion. No prolapse or incontinence with coughing.   Bimanual exam was performed. Uterus anteverted and uterine size at around 8 weeks gestation. No adnexal tenderness or masses.     Plan  Educated patient that it may take 1 to 2 weeks for perineal tears to heal completely  Can use OTC pain medications and warm compresses/sitz bath  Follow-up in 2 months for annual physical  Call clinic if any foul-smelling discharge, heavy bleeding or severe pain/cramps      BMI Counseling: Body mass index is 26.43 kg/m². The BMI is above normal. Nutrition recommendations include decreasing portion sizes, encouraging healthy choices of fruits and vegetables, decreasing fast food intake, consuming healthier snacks, limiting drinks that  contain sugar and moderation in carbohydrate intake. Exercise recommendations include moderate physical activity 150 minutes/week and exercising 3-5 times per week. Rationale for BMI follow-up plan is due to patient being overweight or obese.     Depression Screening and Follow-up Plan: Patient was screened for depression during today's encounter. They screened negative with a PHQ-2 score of 0.            Review of Systems   Constitutional:  Negative for chills and fever.   HENT: Negative.  Negative for ear pain and sore throat.    Eyes:  Negative for pain and visual disturbance.   Respiratory: Negative.  Negative for cough and shortness of breath.    Cardiovascular: Negative.  Negative for chest pain and palpitations.   Gastrointestinal: Negative.  Negative for abdominal pain, constipation, diarrhea and vomiting.   Genitourinary:  Positive for vaginal discharge and vaginal pain. Negative for dysuria and hematuria.   Musculoskeletal:  Negative for arthralgias and back pain.   Skin:  Negative for color change and rash.   Neurological: Negative.  Negative for seizures and syncope.   Hematological: Negative.    Psychiatric/Behavioral: Negative.  Negative for sleep disturbance. The patient is not nervous/anxious.    All other systems reviewed and are negative.    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 No pertinent surgical history.  History reviewed. No pertinent family  history.  Current Outpatient Medications on File Prior to Visit   Medication Sig Dispense Refill    acetaminophen (TYLENOL) 325 mg tablet Take 2 tablets (650 mg total) by mouth every 4 (four) hours as needed for mild pain      ascorbic acid (VITAMIN C) 500 MG tablet Take 1 tablet (500 mg total) by mouth daily 30 tablet 3    benzocaine-menthol-lanolin-aloe (DERMOPLAST) 20-0.5 % topical spray Apply 1 Application topically every 6 (six) hours as needed for mild pain or irritation      calcium carbonate (TUMS) 500 mg chewable tablet Chew 2 tablets (1,000 mg total) 3 (three) times a day as needed for indigestion or heartburn      ferrous sulfate 324 (65 Fe) mg Take 1 tablet (324 mg total) by mouth daily before breakfast 60 tablet 3    ibuprofen (MOTRIN) 600 mg tablet Take 1 tablet (600 mg total) by mouth every 6 (six) hours as needed for moderate pain 30 tablet 0    Prenatal Vit-Fe Fumarate-FA (Prenatal Vitamin) 27-0.8 MG TABS Take 1 tablet by mouth in the morning 90 tablet 2    witch hazel-glycerin (TUCKS) topical pad Apply 1 Pad topically every 4 (four) hours as needed for irritation       Current Facility-Administered Medications on File Prior to Visit   Medication Dose Route Frequency Provider Last Rate Last Admin    etonogestrel (NEXPLANON) subdermal implant 68 mg  68 mg Subdermal Once every 3 years Oni Solano MD   68 mg at 08/13/24 4808   No Known Allergies   Current Outpatient Medications on File Prior to Visit   Medication Sig Dispense Refill    acetaminophen (TYLENOL) 325 mg tablet Take 2 tablets (650 mg total) by mouth every 4 (four) hours as needed for mild pain      ascorbic acid (VITAMIN C) 500 MG tablet Take 1 tablet (500 mg total) by mouth daily 30 tablet 3    benzocaine-menthol-lanolin-aloe (DERMOPLAST) 20-0.5 % topical spray Apply 1 Application topically every 6 (six) hours as needed for mild pain or irritation      calcium carbonate (TUMS) 500 mg chewable tablet Chew 2 tablets (1,000 mg total) 3  "(three) times a day as needed for indigestion or heartburn      ferrous sulfate 324 (65 Fe) mg Take 1 tablet (324 mg total) by mouth daily before breakfast 60 tablet 3    ibuprofen (MOTRIN) 600 mg tablet Take 1 tablet (600 mg total) by mouth every 6 (six) hours as needed for moderate pain 30 tablet 0    Prenatal Vit-Fe Fumarate-FA (Prenatal Vitamin) 27-0.8 MG TABS Take 1 tablet by mouth in the morning 90 tablet 2    witch hazel-glycerin (TUCKS) topical pad Apply 1 Pad topically every 4 (four) hours as needed for irritation       Current Facility-Administered Medications on File Prior to Visit   Medication Dose Route Frequency Provider Last Rate Last Admin    etonogestrel (NEXPLANON) subdermal implant 68 mg  68 mg Subdermal Once every 3 years Oni Solano MD   68 mg at 08/13/24 1544      Social History     Tobacco Use    Smoking status: Never     Passive exposure: Never    Smokeless tobacco: Never   Vaping Use    Vaping status: Never Used   Substance and Sexual Activity    Alcohol use: Yes     Comment: occasional    Drug use: Never    Sexual activity: Never     Objective   /80 (BP Location: Right arm, Patient Position: Sitting, Cuff Size: Adult)   Pulse 88   Temp 97.8 °F (36.6 °C) (Tympanic)   Resp 16   Ht 5' 4\" (1.626 m)   Wt 69.9 kg (154 lb)   SpO2 99%   BMI 26.43 kg/m²     Physical Exam  Vitals and nursing note reviewed.   Constitutional:       General: She is not in acute distress.     Appearance: She is well-developed. She is not ill-appearing, toxic-appearing or diaphoretic.   HENT:      Head: Normocephalic and atraumatic.   Eyes:      Conjunctiva/sclera: Conjunctivae normal.   Cardiovascular:      Rate and Rhythm: Normal rate and regular rhythm.      Pulses: Normal pulses.      Heart sounds: Normal heart sounds. No murmur heard.     No friction rub. No gallop.   Pulmonary:      Effort: Pulmonary effort is normal. No respiratory distress.      Breath sounds: Normal breath sounds. No stridor. " No wheezing, rhonchi or rales.   Chest:      Chest wall: No tenderness.   Abdominal:      General: Bowel sounds are normal. There is no distension.      Palpations: Abdomen is soft. There is no mass.      Tenderness: There is no abdominal tenderness. There is no right CVA tenderness, left CVA tenderness, guarding or rebound.      Hernia: No hernia is present.   Genitourinary:     General: Normal vulva.      Comments: Speculum exam was done which did not show any lacerations or bleeding, some brownish discharge present. Patient had some pain on speculum insertion. No prolapse or incontinence with coughing.   Bimanual exam was performed. Uterus anteverted and uterine size at around 8 weeks gestation. No adnexal tenderness or masses  Musculoskeletal:         General: No swelling.      Cervical back: Neck supple.      Right lower leg: No edema.      Left lower leg: No edema.   Skin:     General: Skin is warm and dry.      Capillary Refill: Capillary refill takes less than 2 seconds.   Neurological:      General: No focal deficit present.      Mental Status: She is alert and oriented to person, place, and time. Mental status is at baseline.   Psychiatric:         Mood and Affect: Mood normal.

## 2024-08-22 NOTE — ASSESSMENT & PLAN NOTE
Patient presents to the clinic for postpartum examination at 3 weeks.  She delivered via  at 39 weeks 1 day on 2024. She had a 2 degree laceration repaired that was repaired.  She currently denies any postpartum depression or anxiety; PHQ-9 done today showed a score of 0.  She has been urinating well and has had regular bowel movements, denies any constipation or hemorrhoids.  She denies any burning with urination but has some burning pain in her vagina whenever she stands up for prolonged periods.  Patient has been breast-feeding and currently has no issues with her feeding. Patient complains of some brownish discharge but denies any bleeding.   Pt had Nexplanon inserted 2 weeks ago and denies any side effects from that.     Speculum exam was done which did not show any lacerations or bleeding, some brownish discharge present. Patient had some pain on speculum insertion. No prolapse or incontinence with coughing.   Bimanual exam was performed. Uterus anteverted and uterine size at around 8 weeks gestation. No adnexal tenderness or masses.     Plan  Educated patient that it may take 1 to 2 weeks for perineal tears to heal completely  Can use OTC pain medications and warm compresses/sitz bath  Follow-up in 2 months for annual physical  Call clinic if any foul-smelling discharge, heavy bleeding or severe pain/cramps

## 2024-10-24 ENCOUNTER — OFFICE VISIT (OUTPATIENT)
Age: 34
End: 2024-10-24

## 2024-10-24 VITALS
HEART RATE: 94 BPM | WEIGHT: 151 LBS | BODY MASS INDEX: 25.78 KG/M2 | RESPIRATION RATE: 17 BRPM | DIASTOLIC BLOOD PRESSURE: 77 MMHG | SYSTOLIC BLOOD PRESSURE: 112 MMHG | OXYGEN SATURATION: 98 % | HEIGHT: 64 IN

## 2024-10-24 DIAGNOSIS — Z00.00 ANNUAL PHYSICAL EXAM: Primary | ICD-10-CM

## 2024-10-24 DIAGNOSIS — Z74.1 NEEDS ASSISTANCE WITH DENTAL CARE: ICD-10-CM

## 2024-10-24 DIAGNOSIS — Z23 ENCOUNTER FOR IMMUNIZATION: ICD-10-CM

## 2024-10-24 PROBLEM — R10.32 LEFT LOWER QUADRANT ABDOMINAL PAIN: Status: RESOLVED | Noted: 2024-01-29 | Resolved: 2024-10-24

## 2024-10-24 PROBLEM — O99.019 ANEMIA IN PREGNANCY: Status: RESOLVED | Noted: 2024-07-09 | Resolved: 2024-10-24

## 2024-10-24 PROBLEM — Z34.91 ENCOUNTER FOR SUPERVISION OF LOW-RISK PREGNANCY IN FIRST TRIMESTER: Status: RESOLVED | Noted: 2024-02-06 | Resolved: 2024-10-24

## 2024-10-24 PROCEDURE — 99395 PREV VISIT EST AGE 18-39: CPT | Performed by: FAMILY MEDICINE

## 2024-10-24 PROCEDURE — 90656 IIV3 VACC NO PRSV 0.5 ML IM: CPT | Performed by: FAMILY MEDICINE

## 2024-10-24 PROCEDURE — 90471 IMMUNIZATION ADMIN: CPT | Performed by: FAMILY MEDICINE

## 2024-10-24 NOTE — PROGRESS NOTES
Adult Annual Physical  Name: Inderjit Matos      : 1990      MRN: [id number]  Encounter Provider: Tyree Guzman DO  Encounter Date: 10/24/2024   Encounter department: [facility]    Assessment & Plan  Annual physical exam  Overall, doing well with baby and adjusting to motherhood. Discussed age appropriate screenings and pertinent labs for this visit. Reviewed symptom management of headaches and back pain and if symptoms progress or worsen to follow up with office. Pt understands and agreeable with plan of care. Answered all questions and concerns at this time.     Orders:    Lipid Panel with Direct LDL reflex; Future    Hemoglobin A1C; Future    Comprehensive metabolic panel; Future    Vitamin D 25 hydroxy; Future    Encounter for immunization    Orders:    influenza vaccine preservative-free 0.5 mL IM (Fluzone, Afluria, Fluarix, Flulaval)    Needs assistance with dental care  Referral sent for Dentistry and assistance given to find new dentist.    Orders:    Ambulatory Referral to Dentistry; Future      Immunizations and preventive care screenings were discussed with patient today. Appropriate education was printed on patient's after visit summary.    Counseling:  Dental Health: discussed importance of regular tooth brushing, flossing, and dental visits.  Injury prevention: discussed safety/seat belts, safety helmets, smoke detectors, carbon monoxide detectors, and smoking near bedding or upholstery.  Exercise: the importance of regular exercise/physical activity was discussed. Recommend exercise 3-5 times per week for at least 30 minutes.        History of Present Illness     Adult Annual Physical:  Patient presents for annual physical. Pt is a pleasant 35 yo F presenting for annual physical. No acute concerns or questions at this visit.    Discussed routine labs and age-appropriate screenings. Pap completed in 2024..     Diet and Physical Activity:  -  Diet/Nutrition: well balanced diet, consuming 3-5 servings of fruits/vegetables daily, limited junk food, portion control and adequate fiber intake.  - Exercise: no formal exercise and walking.    General Health:  - Sleep: 4-6 hours of sleep on average. sleep schedule depends on baby's sleep schedule  - Hearing: normal hearing bilateral ears.  - Vision: no vision problems.  - Dental: no dental visits for > 1 year and brushes teeth twice daily.    /GYN Health:  - Follows with GYN: yes.   - Contraception: IUD placement. Nexplanon      Review of Systems   Constitutional:  Negative for chills, fatigue and fever.   HENT:  Negative for hearing loss, rhinorrhea, sinus pain and sore throat.    Eyes:  Negative for visual disturbance.   Respiratory:  Negative for chest tightness, shortness of breath and wheezing.    Cardiovascular:  Negative for chest pain and palpitations.   Gastrointestinal:  Negative for abdominal pain, constipation, diarrhea, nausea and vomiting.   Genitourinary:  Negative for dysuria, hematuria, pelvic pain, vaginal bleeding, vaginal discharge and vaginal pain.   Musculoskeletal:  Positive for back pain. Negative for arthralgias.   Skin:  Negative for rash and wound.   Neurological:  Positive for headaches. Negative for dizziness, syncope, weakness and light-headedness.     Pertinent Medical History       Medical History Reviewed by provider this encounter:  Tobacco  Allergies  Meds  Problems  Med Hx  Surg Hx  Fam Hx       Current Outpatient Medications on File Prior to Visit   Medication Sig Dispense Refill    acetaminophen (TYLENOL) 325 mg tablet Take 2 tablets (650 mg total) by mouth every 4 (four) hours as needed for mild pain      [DISCONTINUED] ascorbic acid (VITAMIN C) 500 MG tablet Take 1 tablet (500 mg total) by mouth daily (Patient not taking: Reported on 10/24/2024) 30 tablet 3    [DISCONTINUED] benzocaine-menthol-lanolin-aloe (DERMOPLAST) 20-0.5 % topical spray Apply 1 Application  "topically every 6 (six) hours as needed for mild pain or irritation (Patient not taking: Reported on 10/24/2024)      [DISCONTINUED] calcium carbonate (TUMS) 500 mg chewable tablet Chew 2 tablets (1,000 mg total) 3 (three) times a day as needed for indigestion or heartburn (Patient not taking: Reported on 10/24/2024)      [DISCONTINUED] ferrous sulfate 324 (65 Fe) mg Take 1 tablet (324 mg total) by mouth daily before breakfast (Patient not taking: Reported on 10/24/2024) 60 tablet 3    [DISCONTINUED] ibuprofen (MOTRIN) 600 mg tablet Take 1 tablet (600 mg total) by mouth every 6 (six) hours as needed for moderate pain (Patient not taking: Reported on 10/24/2024) 30 tablet 0    [DISCONTINUED] Prenatal Vit-Fe Fumarate-FA (Prenatal Vitamin) 27-0.8 MG TABS Take 1 tablet by mouth in the morning (Patient not taking: Reported on 10/24/2024) 90 tablet 2    [DISCONTINUED] witch hazel-glycerin (TUCKS) topical pad Apply 1 Pad topically every 4 (four) hours as needed for irritation (Patient not taking: Reported on 10/24/2024)       Current Facility-Administered Medications on File Prior to Visit   Medication Dose Route Frequency Provider Last Rate Last Admin    etonogestrel (NEXPLANON) subdermal implant 68 mg  68 mg Subdermal Once every 3 years Oni Solano MD   68 mg at 08/13/24 1544      Social History     Tobacco Use    Smoking status: Never     Passive exposure: Never    Smokeless tobacco: Never   Vaping Use    Vaping status: Never Used   Substance and Sexual Activity    Alcohol use: Yes     Comment: occasional    Drug use: Never    Sexual activity: Never       Objective     /77 (BP Location: Left arm, Patient Position: Sitting)   Pulse 94   Resp 17   Ht 5' 4\" (1.626 m)   Wt 68.5 kg (151 lb)   SpO2 98%   Breastfeeding Yes   BMI 25.92 kg/m²     Physical Exam  Vitals and nursing note reviewed.   Constitutional:       General: She is not in acute distress.     Appearance: Normal appearance.   HENT:      Head: " Normocephalic and atraumatic.      Right Ear: External ear normal.      Left Ear: External ear normal.      Nose: Nose normal.      Mouth/Throat:      Mouth: Mucous membranes are moist.      Pharynx: Oropharynx is clear.   Eyes:      Extraocular Movements: Extraocular movements intact.      Conjunctiva/sclera: Conjunctivae normal.   Cardiovascular:      Rate and Rhythm: Normal rate and regular rhythm.      Pulses: Normal pulses.      Heart sounds: Normal heart sounds.   Pulmonary:      Effort: Pulmonary effort is normal.      Breath sounds: Normal breath sounds.   Abdominal:      General: Abdomen is flat. Bowel sounds are normal. There is no distension.      Palpations: Abdomen is soft.      Tenderness: There is no abdominal tenderness. There is no right CVA tenderness or left CVA tenderness.   Musculoskeletal:         General: No tenderness. Normal range of motion.      Cervical back: Normal range of motion. No tenderness.      Right lower leg: No edema.      Left lower leg: No edema.   Skin:     General: Skin is warm and dry.      Capillary Refill: Capillary refill takes less than 2 seconds.   Neurological:      General: No focal deficit present.      Mental Status: She is alert and oriented to person, place, and time.   Psychiatric:         Mood and Affect: Mood normal.         Behavior: Behavior normal.

## 2024-10-24 NOTE — PATIENT INSTRUCTIONS
"Patient Education     Examen físico de rutina para adultos   Conceptos Básicos   Redactado por los médicos y editores de [employer]   ¿Qué es un examen físico? -- Un examen físico es bernardino consulta de rutina o \"revisión\" con nye médico. También se conoce meagan \"consulta de bienestar\" o \"consulta preventiva\".  Vicky cada consulta, el médico hará lo siguiente:   Preguntará por nye shala física y mental   Preguntará sobre renate hábitos, conductas y estilo de diamante   Le hará un examen   Le administrará las vacunas que gregg necesarias   Hablará con usted sobre cualquier medicina que tome   Le dará consejos sobre nye shala   Responderá renate preguntas  Hacerse revisiones periódicas es bernardino parte importante del cuidado de nye shala. Puede ayudar a nye médico a encontrar y tratar cualquier problema que tenga. Augusto también es importante para prevenir problemas de shala.  Un examen físico de rutina es diferente de bernardino \"consulta por enfermedad\". Bernardino consulta por enfermedad es cuando lo atiende un médico debido a un problema o inquietud de shala. Dado que los exámenes físicos se programan con anticipación, usted puede pensar en lo que quiere preguntarle al médico.  ¿Con qué frecuencia jose hacerme un examen físico? -- Depende de nye edad y de nye estado de shala. En general, en el marilee de las personas mayores de 21 años:   Si tiene menos de 50 años, es posible que pueda hacerse un examen físico cada 3 años.   Si tiene 50 años o más, nye médico podría recomendarle un examen físico cada año.  Si tiene un padecimiento de shala crónico, taylor meagan diabetes o presión arterial rosita, nye médico probablemente querrá verlo con más frecuencia.  ¿Qué sucede vicky un examen físico? -- En general, cada consulta incluirá:   Examen físico - El médico o enfermero revisará nye estatura, peso, frecuencia cardíaca y presión arterial. También le examinará los ojos y los oídos. Le preguntará cómo se siente y si tiene algún síntoma que le moleste.   Medicinas - Es bernardino " "buena idea llevar bernardino lista de todos las medicinas que cuong cada vez que acude a la consulta médica. Nye médico le hablará sobre fide medicinas y responderá a fide preguntas. Dígale si tiene algún efecto secundario que le moleste. También debe informarle si tiene dificultades para pagar alguna de fide medicinas.   Hábitos y comportamientos - Rafael Pena incluye:   Ney dieta   Fide hábitos de ejercicio   Si fuma, dickson alcohol o consume drogas   Si es sexualmente activo   Si se siente seguro en casa  Nye médico hablará con usted sobre las cosas que puede hacer para mejorar nye shala y reducir el riesgo de tener problemas de shala. También ofrecerá ayuda y apoyo. Por ejemplo, si quiere dejar de fumar, puede darle consejos y recetarle medicinas. Si quiere mejorar nye alimentación o realizar más actividad física, nye médico también puede ayudarlo a lograr estos objetivos.   Pruebas de laboratorio, si son necesarias - Las pruebas que le realicen dependerán de nye edad y situación. Por ejemplo, es posible que nye médico quiera revisar nye:   Colesterol   Azúcar en albina   Nivel de ramos   Vacunas - Las vacunas recomendadas dependerán de nye edad, nye shala y de las vacunas que ya haya recibido. Las vacunas son muy importantes porque pueden prevenir ciertas infecciones graves o mortales.   Análisis sobre las pruebas de detección - \"Detección\" significa revisar si hay enfermedades u otros problemas de shala antes de que causen síntomas. Nye médico puede recomendar pruebas de detección según nye edad, riesgo y preferencias. Rafael Pena podría incluir pruebas para detectar:   Cáncer, meagan cáncer de seno, próstata, glenny uterino, ovario, colorrectal, próstata, pulmón o piel   Infecciones de transmisión sexual tales meagan clamidia y gonorrea   Padecimientos de shala mental tales meagan depresión y ansiedad.  El médico le hablará sobre los diferentes tipos de pruebas de detección. Puede ayudarlo a decidir qué pruebas de detección debe hacerse. También le puede " explicar lo que podrían significar los resultados.   Responder preguntas - El examen físico es un buen momento para hacerle preguntas al médico o enfermero sobre nye shala. Si es necesario, también puede derivarlo a otros médicos o especialistas.  Los adultos mayores de 65 años a menudo también necesitan otros cuidados. A medida que envejece, nye médico hablará con usted sobre:   Cómo evitar las caídas en el hogar   Pruebas de audición o visión   Pruebas de memoria   Cómo donal renate medicinas de manera payne   Asegurarse de tener la ayuda y el apoyo que necesita en casa  Todos los artículos se actualizan a medida que se descubre nueva evidencia y culmina nuestro proceso de evaluación por homólogos   Renata artículo se recuperó de UpToDate el: May 02, 2024.  Artículo 219488 Versión [id number].es-419.1  Release: 32.4.3 - C32.122  © 2024 [employer]Date, Inc. Todos los derechos reservados.  Exención de responsabilidad y uso de la información del consumidor   Descargo de responsabilidad: esta información generalizada es un resumen limitado de información sobre el diagnóstico, el tratamiento y/o los medicamentos. No pretende ser exhaustiva y se debe utilizar meagan herramienta para ayudar al usuario a comprender y/o evaluar las posibles opciones de diagnóstico y tratamiento. No incluye toda la información sobre afecciones, tratamientos, medicamentos, efectos secundarios o riesgos puedan ser aplicables a un paciente específico. No tiene el propósito de servir meagan recomendación médica ni de sustituir la recomendación médica, el diagnóstico o el tratamiento de un profesional de atención médica que se base en el examen y la evaluación de renata profesional de la shala respecto a las circunstancias específicas y únicas del paciente. Los pacientes deben hablar con un profesional de atención médica para obtener información completa sobre nye shala, cuestiones médicas y opciones de tratamiento, incluidos los riesgos o los beneficios relacionados con  el uso de medicamentos. Esta información no certifica que los tratamientos o medicamentos gregg seguros, eficaces o estén aprobados para tratar a un paciente específico. [employer] y renate afiliados renuncian a cualquier garantía o responsabilidad relacionada con esta información o el uso de la misma.El uso de esta información está sujeto a las Condiciones de uso, disponibles en https://www.[employer].com/en/know/clinical-effectiveness-terms. 2024© [employer] y renate afiliados y/o licenciantes. Todos los derechos reservados.  Copyright   © 2024 [employer] Todos los derechos reservados.

## 2024-10-26 ENCOUNTER — APPOINTMENT (OUTPATIENT)
Dept: LAB | Facility: HOSPITAL | Age: 34
End: 2024-10-26
Payer: SUBSIDIZED

## 2024-10-26 DIAGNOSIS — Z00.00 ANNUAL PHYSICAL EXAM: ICD-10-CM

## 2024-10-26 LAB
25(OH)D3 SERPL-MCNC: 21.1 NG/ML (ref 30–100)
ALBUMIN SERPL BCG-MCNC: 4.6 G/DL (ref 3.5–5)
ALP SERPL-CCNC: 80 U/L (ref 34–104)
ALT SERPL W P-5'-P-CCNC: 10 U/L (ref 7–52)
ANION GAP SERPL CALCULATED.3IONS-SCNC: 8 MMOL/L (ref 4–13)
AST SERPL W P-5'-P-CCNC: 12 U/L (ref 13–39)
BILIRUB SERPL-MCNC: 0.65 MG/DL (ref 0.2–1)
BUN SERPL-MCNC: 16 MG/DL (ref 5–25)
CALCIUM SERPL-MCNC: 9.3 MG/DL (ref 8.4–10.2)
CHLORIDE SERPL-SCNC: 109 MMOL/L (ref 96–108)
CHOLEST SERPL-MCNC: 181 MG/DL
CO2 SERPL-SCNC: 23 MMOL/L (ref 21–32)
CREAT SERPL-MCNC: 0.82 MG/DL (ref 0.6–1.3)
EST. AVERAGE GLUCOSE BLD GHB EST-MCNC: 108 MG/DL
GFR SERPL CREATININE-BSD FRML MDRD: 94 ML/MIN/1.73SQ M
GLUCOSE P FAST SERPL-MCNC: 89 MG/DL (ref 65–99)
HBA1C MFR BLD: 5.4 %
HDLC SERPL-MCNC: 59 MG/DL
LDLC SERPL CALC-MCNC: 104 MG/DL (ref 0–100)
POTASSIUM SERPL-SCNC: 4.3 MMOL/L (ref 3.5–5.3)
PROT SERPL-MCNC: 7.5 G/DL (ref 6.4–8.4)
SODIUM SERPL-SCNC: 140 MMOL/L (ref 135–147)
TRIGL SERPL-MCNC: 88 MG/DL

## 2024-10-26 PROCEDURE — 80061 LIPID PANEL: CPT

## 2024-10-26 PROCEDURE — 82306 VITAMIN D 25 HYDROXY: CPT

## 2024-10-26 PROCEDURE — 36415 COLL VENOUS BLD VENIPUNCTURE: CPT

## 2024-10-26 PROCEDURE — 80053 COMPREHEN METABOLIC PANEL: CPT

## 2024-10-26 PROCEDURE — 83036 HEMOGLOBIN GLYCOSYLATED A1C: CPT

## 2024-10-31 ENCOUNTER — TELEPHONE (OUTPATIENT)
Age: 34
End: 2024-10-31

## 2024-10-31 NOTE — TELEPHONE ENCOUNTER
Called patient with [facility] . Discussed lab findings and starting vitamin D supplementation along with follow up in 3 months to re-assess. Answered all questions and concerns at this time.

## 2025-02-21 ENCOUNTER — OFFICE VISIT (OUTPATIENT)
Age: 35
End: 2025-02-21

## 2025-02-21 VITALS
TEMPERATURE: 97.9 F | OXYGEN SATURATION: 98 % | BODY MASS INDEX: 26.4 KG/M2 | HEART RATE: 71 BPM | SYSTOLIC BLOOD PRESSURE: 112 MMHG | WEIGHT: 153.8 LBS | DIASTOLIC BLOOD PRESSURE: 72 MMHG

## 2025-02-21 DIAGNOSIS — M54.2 NECK PAIN: ICD-10-CM

## 2025-02-21 DIAGNOSIS — G43.009 MIGRAINE WITHOUT AURA AND WITHOUT STATUS MIGRAINOSUS, NOT INTRACTABLE: Primary | ICD-10-CM

## 2025-02-21 DIAGNOSIS — R10.9 ABDOMINAL PAIN, UNSPECIFIED ABDOMINAL LOCATION: ICD-10-CM

## 2025-02-21 PROCEDURE — 99214 OFFICE O/P EST MOD 30 MIN: CPT | Performed by: FAMILY MEDICINE

## 2025-02-21 RX ORDER — SUMATRIPTAN 50 MG/1
50 TABLET, FILM COATED ORAL ONCE AS NEEDED
Qty: 10 TABLET | Refills: 2 | Status: SHIPPED | OUTPATIENT
Start: 2025-02-21 | End: 2025-03-23

## 2025-02-21 NOTE — PROGRESS NOTES
Name: Inderjit Matos      : 1990      MRN: [id number]  Encounter Provider: Tyree Guzman DO  Encounter Date: 2025   Encounter department: [facility]  :  Assessment & Plan  Migraine without aura and without status migrainosus, not intractable  Headaches have been present since age 14/15 and have always been around the time of her period. The pain will be a sharp, stabbing sensation that starts around her R eye and will then spread to the back of her head and extend down the posterior neck. She has tried Tylenol and Ibuprofen but nothing makes the pain go away. Bright lights and loud sounds aggravate the symptoms and she will need to rest in a dark, quiet room until the headache resolves. These headaches occur 4-5 times/month. She has no methods for stress control or established sleep routine because she is busy taking care of her baby.    Discussed establishing a self care routine and getting better quality sleep.   Ordered Sumatriptan 50 mg PRN for rescue relief of migraine headaches. Reviewed possible side effects, usage, dosage, and warning regarding breast feeding timing (wait 8 hours after taking medication before resuming breast feeding - reviewed pump and dump method).  Ordered to start the following migraine supplement regimen: Riboflavin 400 mg daily, CoQ10 100 mg TID, and Mag Citrate 600 mg daily. Reviewed supplements and dosage.  Will follow up in 2 months.    Abdominal pain, unspecified abdominal location  For the past 2 months she has been experiencing mild, crampy abdominal pain that is focused in the b/l lower quadrants. She has not had her period return yet, but is breast feeding (baby is 6 months old now and Nexplanon is inserted). Denies urinary symptoms, N/V/D, constipation, fever, chills, fatigue. She will take Tylenol and the pain resolves. Discussed symptomatic management with Tylenol, heating pad usage, and monitoring for return of  her period. Will follow up in 2 months.    Neck pain  In addition to headache, she has noticed posterior R sided neck pain. Discussed pain management and referral for OMT clinic.      BMI Counseling: Body mass index is 26.4 kg/m². The BMI is above normal. Nutrition recommendations include decreasing fast food intake, consuming healthier snacks, limiting drinks that contain sugar, increasing intake of lean protein and reducing intake of cholesterol. Exercise recommendations include exercising 3-5 times per week and strength training exercises. Rationale for BMI follow-up plan is due to patient being overweight or obese.       History of Present Illness   Patient is a pleasant 33 yo F presenting for follow up. She has been noticing increased headaches and intermittent abdominal pain since her last visit. We discussed symptom management, starting rescue medications for her migraines, and monitoring her abdominal pain/restart of her period. Will follow up in 2 months.      Review of Systems   Constitutional:  Negative for chills, fatigue and fever.   HENT:  Negative for hearing loss and sore throat.    Eyes:  Negative for visual disturbance.   Respiratory:  Negative for cough and shortness of breath.    Cardiovascular:  Negative for chest pain and leg swelling.   Gastrointestinal:  Positive for abdominal pain. Negative for constipation, diarrhea, nausea and vomiting.   Genitourinary:  Negative for dysuria, frequency, hematuria, urgency, vaginal bleeding, vaginal discharge and vaginal pain.   Skin:  Negative for rash and wound.   Neurological:  Positive for headaches. Negative for dizziness, syncope, weakness and light-headedness.       Objective   /72 (BP Location: Left arm, Patient Position: Sitting, Cuff Size: Standard)   Pulse 71   Temp 97.9 °F (36.6 °C) (Tympanic)   Wt 69.8 kg (153 lb 12.8 oz)   SpO2 98%   BMI 26.40 kg/m²      Physical Exam  Vitals and nursing note reviewed.   Constitutional:        General: She is not in acute distress.     Appearance: Normal appearance.   HENT:      Head: Normocephalic and atraumatic.      Right Ear: External ear normal.      Left Ear: External ear normal.      Nose: Nose normal.      Mouth/Throat:      Mouth: Mucous membranes are moist.      Pharynx: Oropharynx is clear.   Eyes:      Extraocular Movements: Extraocular movements intact.   Cardiovascular:      Rate and Rhythm: Normal rate and regular rhythm.      Pulses: Normal pulses.      Heart sounds: Normal heart sounds.   Pulmonary:      Effort: Pulmonary effort is normal.      Breath sounds: Normal breath sounds.   Chest:      Chest wall: No tenderness.   Abdominal:      General: Abdomen is flat. Bowel sounds are normal. There is no distension.      Palpations: Abdomen is soft.      Tenderness: There is abdominal tenderness (minimal TTP b/l lower quadrants with deep palpation). There is no right CVA tenderness, left CVA tenderness or guarding.   Musculoskeletal:         General: Normal range of motion.      Cervical back: Normal range of motion. Tenderness (TTP R posterior cervical paraspinal and extends up to R side of occiput) present.      Right lower leg: No edema.      Left lower leg: No edema.   Skin:     General: Skin is warm and dry.      Capillary Refill: Capillary refill takes less than 2 seconds.   Neurological:      General: No focal deficit present.      Mental Status: She is alert and oriented to person, place, and time.   Psychiatric:         Mood and Affect: Mood normal.         Behavior: Behavior normal.

## 2025-03-25 NOTE — PROGRESS NOTES
Procedure: Nexplanon insertion    The patient is right handed. The left arm was chosen. Consent was explained and signed. The patient was not pregnant; confirmed by Urine pregnancy test and she just delivered a baby. The patient was positioned supine with her arm externally rotated and flexed at the elbow with her hand behind her head. The insertion site was chosen 8-10 cm medial to the medial epicondyle, and 3-5 cm posterior the sulcus between the bicep and tricep in order to avoid the vascular bundle and ulnar nerve. The skin was cleansed with Betadine for infiltration of 1% lidocaine and for insertion of the device.    The device was removed from its package. The implant was visible within the insertion device. The insertion was performed according to standard procedure: The tip of the  was passed through the skin, the skin was tented up, and the device was advanced until its full length was beneath the skin. The trigger was activated at and the sheath withdrawn. The implant was successfully deployed.    There was a small amount of bleeding from the skin edge which was controlled with pressure. The implant was palpable by me, the patient, and the supervising attending Dr. Mccurdy.  Three Steristrips and a Band-Aid was placed over the insertion site. The patient tolerated the procedure well.    Instructions were given  The wrap can be removed in 5 hours. Can be loosened if need be.  Return to the office in 2 weeks for scheduled PP visit.   Instructions given.     Lot number: [id number]; [id number]  Expiration: 05/20/2025    Dr. Mccurdy was present during the entire procedure.     MD ANNAMARIA Delatorre  PGY-2      Writer attempted to place call to patient to relay results. No answer. Left detailed message and to return call to clinic with questions or concerns.

## 2025-04-23 ENCOUNTER — OFFICE VISIT (OUTPATIENT)
Age: 35
End: 2025-04-23

## 2025-04-23 VITALS
OXYGEN SATURATION: 98 % | DIASTOLIC BLOOD PRESSURE: 64 MMHG | WEIGHT: 152 LBS | SYSTOLIC BLOOD PRESSURE: 117 MMHG | BODY MASS INDEX: 25.95 KG/M2 | HEART RATE: 78 BPM | RESPIRATION RATE: 16 BRPM | TEMPERATURE: 98 F | HEIGHT: 64 IN

## 2025-04-23 DIAGNOSIS — N91.1 POSTPARTUM AMENORRHEA: ICD-10-CM

## 2025-04-23 DIAGNOSIS — G43.009 MIGRAINE WITHOUT AURA AND WITHOUT STATUS MIGRAINOSUS, NOT INTRACTABLE: Primary | ICD-10-CM

## 2025-04-23 DIAGNOSIS — R23.3 EASY BRUISING: ICD-10-CM

## 2025-04-23 PROCEDURE — 99213 OFFICE O/P EST LOW 20 MIN: CPT | Performed by: FAMILY MEDICINE

## 2025-04-23 NOTE — ASSESSMENT & PLAN NOTE
Noted to have much improved. Has only had one episode of headache in the past month which was controlled with Tylenol and cold compresses. Discussed her previous medication and supplement regimen and notes she has not picked them up. She is having financial difficulty purchasing the medication and supplements. Ordered Financial Counseling referral to discuss setting up a payment plan/financial aid to help with purchasing medications and completing lab work. Will follow up in 2 months.

## 2025-04-23 NOTE — PROGRESS NOTES
Name: Inderjit Matos      : 1990      MRN: [id number]  Encounter Provider: Tyree Guzman DO  Encounter Date: 2025   Encounter department: [facility]  :  Assessment & Plan  Migraine without aura and without status migrainosus, not intractable  Noted to have much improved. Has only had one episode of headache in the past month which was controlled with Tylenol and cold compresses. Discussed her previous medication and supplement regimen and notes she has not picked them up. She is having financial difficulty purchasing the medication and supplements. Ordered Financial Counseling referral to discuss setting up a payment plan/financial aid to help with purchasing medications and completing lab work. Will follow up in 2 months.    Postpartum amenorrhea  Period has not returned yet - still breast feeding full time.  Currently at ~8 month pebbles. Nexplanon is in arm as birth control.  Denies associated symptoms at this time.  Discussed follow up in 2 months and if period has not returned we will start hormonal work up to reassess causes of amenorrhea.  Easy bruising  Has started to notice for the past few weeks she has been bruising easier than normal/noticed more bruises in general. Has no other associated symptoms. No anemia noted during pregnancy. Discussed follow up lab work and possible iron supplementation.    Orders:  •  CBC and differential; Future  •  Iron Panel (Includes Ferritin, Iron Sat%, Iron, and TIBC); Future         History of Present Illness {?Quick Links Encounters * My Last Note * Last Note in Specialty * Snapshot * Since Last Visit * History :33062}  Patient is a pleasant 35 yo F presenting for follow up of migraines, postpartum amenorrhea and new onset bruising. Discussed symptomatic management and follow up lab work. Will follow up in 2 months.      Review of Systems   Constitutional:  Negative for chills, diaphoresis, fatigue and fever.  "  HENT:  Negative for hearing loss and sore throat.    Eyes:  Negative for visual disturbance.   Respiratory:  Negative for cough and shortness of breath.    Cardiovascular:  Negative for chest pain.   Gastrointestinal:  Negative for abdominal pain, constipation, diarrhea, nausea and vomiting.   Genitourinary:  Negative for difficulty urinating, dysuria, hematuria and vaginal pain.   Musculoskeletal:  Negative for arthralgias and back pain.   Skin:  Negative for rash and wound.   Neurological:  Negative for dizziness, weakness, light-headedness and headaches.   Hematological:  Bruises/bleeds easily.       Objective {?Quick Links Trend Vitals * Enter New Vitals * Results Review * Timeline (Adult) * Labs * Imaging * Cardiology * Procedures * Lung Cancer Screening * Surgical eConsent :46583}  /64 (BP Location: Left arm, Patient Position: Sitting, Cuff Size: Adult)   Pulse 78   Temp 98 °F (36.7 °C) (Tympanic)   Resp 16   Ht 5' 4\" (1.626 m)   Wt 68.9 kg (152 lb)   SpO2 98%   BMI 26.09 kg/m²      Physical Exam  Vitals reviewed.   Constitutional:       General: She is not in acute distress.     Appearance: Normal appearance.   HENT:      Head: Normocephalic and atraumatic.      Right Ear: External ear normal.      Left Ear: External ear normal.      Nose: Nose normal.      Mouth/Throat:      Mouth: Mucous membranes are moist.      Pharynx: Oropharynx is clear.   Eyes:      General:         Right eye: No discharge.         Left eye: No discharge.      Extraocular Movements: Extraocular movements intact.      Conjunctiva/sclera: Conjunctivae normal.   Cardiovascular:      Rate and Rhythm: Normal rate and regular rhythm.      Pulses: Normal pulses.      Heart sounds: Normal heart sounds.   Pulmonary:      Effort: Pulmonary effort is normal.      Breath sounds: Normal breath sounds.   Abdominal:      General: Abdomen is flat. Bowel sounds are normal. There is no distension.      Palpations: Abdomen is soft.      " Tenderness: There is no abdominal tenderness. There is no right CVA tenderness, left CVA tenderness or guarding.   Musculoskeletal:         General: No tenderness. Normal range of motion.      Cervical back: Normal range of motion.      Right lower leg: No edema.      Left lower leg: No edema.   Skin:     General: Skin is warm and dry.      Capillary Refill: Capillary refill takes less than 2 seconds.      Findings: Bruising (multiple small bruises scattered on arm and lower extremities) present.   Neurological:      General: No focal deficit present.      Mental Status: She is alert and oriented to person, place, and time.   Psychiatric:         Mood and Affect: Mood normal.         Behavior: Behavior normal.

## 2025-06-26 ENCOUNTER — APPOINTMENT (OUTPATIENT)
Dept: LAB | Facility: HOSPITAL | Age: 35
End: 2025-06-26
Payer: SUBSIDIZED

## 2025-06-26 DIAGNOSIS — R23.3 EASY BRUISING: ICD-10-CM

## 2025-06-26 LAB
BASOPHILS # BLD AUTO: 0.02 THOUSANDS/ÂΜL (ref 0–0.1)
BASOPHILS NFR BLD AUTO: 0 % (ref 0–1)
EOSINOPHIL # BLD AUTO: 0.07 THOUSAND/ÂΜL (ref 0–0.61)
EOSINOPHIL NFR BLD AUTO: 1 % (ref 0–6)
ERYTHROCYTE [DISTWIDTH] IN BLOOD BY AUTOMATED COUNT: 13.2 % (ref 11.6–15.1)
FERRITIN SERPL-MCNC: 21 NG/ML (ref 30–307)
HCT VFR BLD AUTO: 38.3 % (ref 34.8–46.1)
HGB BLD-MCNC: 13.1 G/DL (ref 11.5–15.4)
IMM GRANULOCYTES # BLD AUTO: 0.02 THOUSAND/UL (ref 0–0.2)
IMM GRANULOCYTES NFR BLD AUTO: 0 % (ref 0–2)
IRON SATN MFR SERPL: 26 % (ref 15–50)
IRON SERPL-MCNC: 93 UG/DL (ref 50–212)
LYMPHOCYTES # BLD AUTO: 2.04 THOUSANDS/ÂΜL (ref 0.6–4.47)
LYMPHOCYTES NFR BLD AUTO: 34 % (ref 14–44)
MCH RBC QN AUTO: 30 PG (ref 26.8–34.3)
MCHC RBC AUTO-ENTMCNC: 34.2 G/DL (ref 31.4–37.4)
MCV RBC AUTO: 88 FL (ref 82–98)
MONOCYTES # BLD AUTO: 0.35 THOUSAND/ÂΜL (ref 0.17–1.22)
MONOCYTES NFR BLD AUTO: 6 % (ref 4–12)
NEUTROPHILS # BLD AUTO: 3.52 THOUSANDS/ÂΜL (ref 1.85–7.62)
NEUTS SEG NFR BLD AUTO: 59 % (ref 43–75)
NRBC BLD AUTO-RTO: 0 /100 WBCS
PLATELET # BLD AUTO: 219 THOUSANDS/UL (ref 149–390)
PMV BLD AUTO: 9.2 FL (ref 8.9–12.7)
RBC # BLD AUTO: 4.36 MILLION/UL (ref 3.81–5.12)
TIBC SERPL-MCNC: 364 UG/DL (ref 250–450)
TRANSFERRIN SERPL-MCNC: 260 MG/DL (ref 203–362)
UIBC SERPL-MCNC: 271 UG/DL (ref 155–355)
WBC # BLD AUTO: 6.02 THOUSAND/UL (ref 4.31–10.16)

## 2025-06-26 PROCEDURE — 36415 COLL VENOUS BLD VENIPUNCTURE: CPT

## 2025-06-26 PROCEDURE — 82728 ASSAY OF FERRITIN: CPT

## 2025-06-26 PROCEDURE — 85025 COMPLETE CBC W/AUTO DIFF WBC: CPT

## 2025-06-26 PROCEDURE — 83540 ASSAY OF IRON: CPT

## 2025-06-26 PROCEDURE — 83550 IRON BINDING TEST: CPT

## 2025-07-08 ENCOUNTER — OFFICE VISIT (OUTPATIENT)
Age: 35
End: 2025-07-08

## 2025-07-08 VITALS
SYSTOLIC BLOOD PRESSURE: 99 MMHG | RESPIRATION RATE: 18 BRPM | OXYGEN SATURATION: 99 % | WEIGHT: 154 LBS | HEART RATE: 65 BPM | DIASTOLIC BLOOD PRESSURE: 63 MMHG | BODY MASS INDEX: 26.43 KG/M2

## 2025-07-08 DIAGNOSIS — G43.009 MIGRAINE WITHOUT AURA AND WITHOUT STATUS MIGRAINOSUS, NOT INTRACTABLE: Primary | ICD-10-CM

## 2025-07-08 DIAGNOSIS — N91.1 POSTPARTUM AMENORRHEA: ICD-10-CM

## 2025-07-08 DIAGNOSIS — E61.1 IRON DEFICIENCY: ICD-10-CM

## 2025-07-08 PROCEDURE — 99213 OFFICE O/P EST LOW 20 MIN: CPT | Performed by: FAMILY MEDICINE

## 2025-07-08 RX ORDER — FERROUS SULFATE 324(65)MG
324 TABLET, DELAYED RELEASE (ENTERIC COATED) ORAL
Qty: 30 TABLET | Refills: 1 | Status: SHIPPED | OUTPATIENT
Start: 2025-07-08

## 2025-07-08 NOTE — PROGRESS NOTES
Name: Inderjit Matos      : 1990      MRN: [id number]  Encounter Provider: Janeth Corcoran MD  Encounter Date: 2025   Encounter department: [facility]  :  Assessment & Plan  Migraine without aura and without status migrainosus, not intractable  Chronic, stable.   Controlled with Tylenol PRN.   Reports not taking Sumatriptan due to current breastfeeding. But does not feel she needs it.     Plan:   -Continue to monitor and use OTC remedies as needed  -F/U in 2-3 months         Postpartum amenorrhea  S/p Nexplanon placement in 2024.   Patient has no complaints.   Continues to endorse not having a period. Denies spotting.   Amenorrhea is secondary to Nexplanon.     Plan:   -Continue to monitor  -F/U in 2-3 months         Iron deficiency  2025 iron panel reviewed- noted TIBC 364, transferrin 260, iron saturation 26, ferritin 21 (low)  Hgb and MCV on 2025 CBC- WNL  Physical exam WNL.     Plan:  -Initiate 65 Fe daily before breakfast  -Counseled on taking with orange/citrus juice if possible  -F/U in 2-3 months, with plan for repeat iron panel to assess response    Orders:    ferrous sulfate 324 (65 Fe) mg; Take 1 tablet (324 mg total) by mouth daily before breakfast           History of Present Illness   -Interviewed with assistance of [employer]   -Migraines: 2-3x/month, controlled with Tylenol/Motrin, not taking Sumatriptan b/c breastfeeding  -Amenorrhea: still not has had periods, no spotting   -Has Nexplanon, doing well on it overall      Review of Systems   Constitutional:  Negative for chills and fever.   HENT:  Negative for ear pain and sore throat.    Eyes:  Negative for pain and visual disturbance.   Respiratory:  Negative for cough and shortness of breath.    Cardiovascular:  Negative for chest pain and palpitations.   Gastrointestinal:  Negative for abdominal pain and vomiting.   Genitourinary:  Negative for menstrual problem and  vaginal bleeding.   Musculoskeletal:  Negative for arthralgias and back pain.   Skin:  Negative for color change and rash.   Neurological:  Negative for seizures and syncope.   All other systems reviewed and are negative.      Objective   BP 99/63 (BP Location: Right arm, Patient Position: Sitting)   Pulse 65   Resp 18   Wt 69.9 kg (154 lb)   SpO2 99%   Breastfeeding Yes   BMI 26.43 kg/m²      Physical Exam  Vitals reviewed.   Constitutional:       General: She is not in acute distress.     Appearance: Normal appearance. She is not ill-appearing.   HENT:      Head: Normocephalic and atraumatic.      Nose: Nose normal.      Mouth/Throat:      Mouth: Mucous membranes are moist.      Pharynx: Oropharynx is clear.     Eyes:      Conjunctiva/sclera: Conjunctivae normal.       Cardiovascular:      Rate and Rhythm: Normal rate and regular rhythm.   Pulmonary:      Effort: Pulmonary effort is normal. No respiratory distress.      Breath sounds: Normal breath sounds.   Abdominal:      General: Abdomen is flat.      Palpations: Abdomen is soft.     Musculoskeletal:         General: No signs of injury. Normal range of motion.      Cervical back: Normal range of motion and neck supple.     Skin:     General: Skin is warm and dry.     Neurological:      Mental Status: She is alert and oriented to person, place, and time.     Psychiatric:         Mood and Affect: Mood normal.

## 2025-07-08 NOTE — ASSESSMENT & PLAN NOTE
Chronic, stable.   Controlled with Tylenol PRN.   Reports not taking Sumatriptan due to current breastfeeding. But does not feel she needs it.     Plan:   -Continue to monitor and use OTC remedies as needed  -F/U in 2-3 months